# Patient Record
Sex: MALE | Race: WHITE | NOT HISPANIC OR LATINO | Employment: OTHER | URBAN - METROPOLITAN AREA
[De-identification: names, ages, dates, MRNs, and addresses within clinical notes are randomized per-mention and may not be internally consistent; named-entity substitution may affect disease eponyms.]

---

## 2024-02-26 ENCOUNTER — EVALUATION (OUTPATIENT)
Dept: PHYSICAL THERAPY | Facility: CLINIC | Age: 74
End: 2024-02-26
Payer: MEDICARE

## 2024-02-26 VITALS — SYSTOLIC BLOOD PRESSURE: 142 MMHG | OXYGEN SATURATION: 95 % | DIASTOLIC BLOOD PRESSURE: 85 MMHG | HEART RATE: 54 BPM

## 2024-02-26 DIAGNOSIS — G20.A1 PARKINSON DISEASE (MULTI): ICD-10-CM

## 2024-02-26 PROCEDURE — 97530 THERAPEUTIC ACTIVITIES: CPT | Mod: GP

## 2024-02-26 PROCEDURE — 97162 PT EVAL MOD COMPLEX 30 MIN: CPT | Mod: GP

## 2024-02-26 ASSESSMENT — ENCOUNTER SYMPTOMS
LOSS OF SENSATION IN FEET: 0
OCCASIONAL FEELINGS OF UNSTEADINESS: 1
DEPRESSION: 0

## 2024-02-26 ASSESSMENT — PAIN SCALES - GENERAL: PAINLEVEL_OUTOF10: 0 - NO PAIN

## 2024-02-26 ASSESSMENT — PAIN - FUNCTIONAL ASSESSMENT: PAIN_FUNCTIONAL_ASSESSMENT: 0-10

## 2024-02-26 ASSESSMENT — ACTIVITIES OF DAILY LIVING (ADL): ADL_ASSISTANCE: INDEPENDENT

## 2024-02-26 NOTE — PROGRESS NOTES
Physical Therapy    Physical Therapy Evaluation and Treatment      Patient Name: Danial Salazar  MRN: [id number]  Today's Date: 2/26/2024  Time Calculation  Start Time: 0215  Stop Time: 0319  Time Calculation (min): 64 min  Billing:  Evaluation:   Evaluation: (Medium): 41  Treatment:   Therapeutic Activity:  23     Insurance:  Visit #: 1  Insurance Reviewed (per information provided by  pre-cert team)  Authorization required:  No, Medicare   Dates: 2/26/24 to 5/26/24    Assessment:  PT Assessment  PT Assessment Results: Decreased strength, Impaired balance, Decreased endurance, Decreased mobility, Impaired hearing  Rehab Prognosis: Fair  Evaluation/Treatment Tolerance: Patient tolerated treatment well   73yr M pt presents to physical therapy with diagnosis of Parkinson's Disease. During examination patient has decreased BL LE strength, decreased tissue extensibility in LE, decreased functional mobility, decreased tolerance with activity, decreased dynamic balance, gait/stair dysfunction, and is (+) for tremors in UE. Spent the session evaluating and educating the patient on parkinson's disease, the benefits of PT, and how PT can assist in reducing symptoms. pt would benefit from skilled physical therapy to maximize pt safety, increase tolerance to activity and to address impairments to restore PLOF with ADLs, functional mobility and participation in activities.      Plan:  OP PT Plan  Treatment/Interventions: Cryotherapy, Education/ Instruction, Manual therapy, Neuromuscular re-education, Self care/ home management, Taping techniques, Therapeutic activities, Therapeutic exercises, Biofeedback, Gait training  PT Plan: Skilled PT  PT Frequency: 1 time per week  Duration: 6 weeks  Onset Date: 02/19/24  Certification Period Start Date: 02/26/24  Certification Period End Date: 05/26/24  Rehab Potential: Fair  Plan of Care Agreement: Patient    NV: Start PWR Moves standing; Gait with walking sticks, MIHIR on Nustep      Current Problem:   1. Parkinson disease  Referral to Physical Therapy    Follow Up In Physical Therapy          Subjective    General:  General  Reason for Referral: Parkinson's  Referred By: MD Esa  Past Medical History Relevant to Rehab: occassionally high blood pressure (according to pt), PD  Preferred Learning Style: visual, verbal, written  General Comment: Reports he was diagnosed with Parkinson's late last year - reports he was at [facility] during that time period where hazerdous toxins and chemicals were in the water. Would like to work on his gait and stability. States he is not taking any medication for parkinsons - but also states he is taking medicine for his tremors (2x/day) states it is not helping. Started using his cane about a year ago for out in the community. Reports down the stairs is harder to complete then ascending up the stairs. Says tremors are staying about the same and walking is gettign worse. walking short distances is difficult.  Precautions:  Precautions  STEADI Fall Risk Score (The score of 4 or more indicates an increased risk of falling): Yes; Fall Risk  Hearing/Visual Limitations: [place]  Medical Precautions: Fall precautions  Vital Signs:  Vital Signs  Heart Rate: 54  Heart Rate Source: Monitor  SpO2: 95 %  BP: 142/85  BP Location: Right arm  BP Method: Automatic  Pain:  Pain Assessment  Pain Assessment: 0-10  Pain Score: 0 - No pain  Home Living:  Home Living  Home Living Comment: 1 Story with 12 steps to the basement (wall with railing). Tub/Shower is being used although has a walk-in shower located in the basement. No grab bars  Prior Level of Function:  Prior Function Per Pt/Caregiver Report  Level of [place]: Independent with ADLs and functional transfers  Receives Help From: Family (wife)  ADL Assistance: Independent  Homemaking Assistance: Needs assistance  Meal Prep:  (Wife completes)  Laundry:  (Wife completes)  Vacuuming:  (wife completes)  Cleaning:  (wife  completes)  Driving/Transportation: Independent  Shopping: Independent  Ambulatory Assistance: Needs assistance  Transfers: Assistive device  Gait: Assistive device  Stairs: Railings  Vocational: Retired  Leisure: Has model trains set up in living room  Hand Dominance: Right    Objective   Functional Assessments:  Balance Comment: See MiniBest  Stairs Comment: Ascends/Descends 4 6in steps in a reciprocal pattern with BL use of HR; decreased foot clearance to clear steps. CGA with use of gait belt  Bed Mobility Comment: Sup/Sit Mod A; Sit to sup: Min A  - LE assist  Transfers Comment: IND sit/stand and stand/sit    Extremity/Trunk Assessments:  RLE   RLE : Exceptions to WFL  AROM RLE (degrees)  RLE AROM Comment: WNL  PROM RLE (degrees)  RLE PROM Comment: WNL  Strength RLE  R Hip Flexion: 4+/5  R Hip ABduction: 4/5  R Hip ADduction: 4/5  R Knee Flexion: 5/5  R Knee Extension: 4+/5  R Ankle Dorsiflexion: 4/5  R Ankle Plantar Flexion: 4/5  LLE   LLE : Exceptions to WFL  AROM LLE (degrees)  LLE AROM Comment: WNL  PROM LLE (degrees)  LLE PROM Comment: WNL  Strength LLE  L Hip Flexion: 4/5  L Hip ABduction: 4/5  L Hip ADduction: 4/5  L Knee Flexion: 4/5  L Knee Extension: 5/5  L Ankle Dorsiflexion: 5/5  L Ankle Plantar Flexion: 5/5    Gait: ambulates for 75+ft with SPC in clinic with shuffling gait, decreased knee extension throughout gait cycle; cane in the R extremity, BL knee flexion through entire gait cycle; decreased push off and swing phase; festination     HS 90/90 (+) Tight     4 Stage: NBOS: 10s, R Semi Tandem: 10s, L Tandem: 8s, R Tandem: 6s, SLS L: 20s;  SLS R: <20s    Outcome Measures:  Mini-BEST Balance Evaluation Systems Test  Anticipatory sit to stand: 2  Anticipatory rise to toes: 1  Anticipatory standing: Right  Anticipatory standing lowest score: 1  RPC Compensatory stepping correction-forward: 1  RPC Compensatory stepping correction-backward: 0  Compensatory stepping correction lateral:  Left  Compensatory stepping correction lateral lowest score: 0  Sensory orientation stance (feet together); eyes open, firm surface : 2  Sensory orientation stance (feet together); eyes closed, foam surface: 1 (18s)  DG Change in gait speed: 1  DG Walk with head turns-horizontal: 1  DG Walk with pivot turns: 1  DG Step over obstacles: 1  Time up & Go with dual task: 0  TUG seconds: 13.69  TUG Dual Task seconds: 28.5  Score: 13/28     Timed Up and Go Test  How many seconds did it take to complete the 5 tasks?: 13.69 seconds  Observe the patient’s postural stability, gait, stride length, and sway. Select All that Apply:: Slow tentative pace, Short strides, Little or no arm swing, Shuffling  TUG Cognitive: 28.5s with SPC; gait belt; CGA    Other Measures  5x Sit to Stand: 19.89s with no UE assist; standard chair  Activities - Specific Balance Confidence Scale: 45.62%     Treatments:  Therapeutic Activity  Therapeutic Activity Performed: Yes  Therapeutic Activity 1: Mod complex Assessment  1. Repeated sit to/from stands from standardized chair height. Required VC for no use of hands, nose over toes, full upright stand from chair, use of anterior shift with fwd momentum and eccentric control into chair.  2. Functional Performance via gait analysis of TUG and Cog Tug: Verbal cues for sequencing/positioning. 2x10' at CGA with gait belt.   3. Functional mobility via AROM/PROM of LE; Verbal cues for sequencing/positioning.  4. Functional Performance via MMT of LE: Hip, knee, ankle; Verbal cues for sequencing/positioning.  5. Educated pt on POC, goals of physical therapy, purpose of physical therapy, as well as the benefits in participating in physical therapy to increase functional mobility and maximize pt safety.    6. Functional Performance via MiniBest - completed at CGA to Min A, gait belt; VC for proper sequencing/positioning. Decreased dynamic balance and reactive postural control.   7. Educated patient on the disease  process of parkinson's, symptoms of parkinson's and how PT can assist with the disease.     EDUCATION:  Outpatient Education  Individual(s) Educated: Patient  Education Provided: Anatomy, Body Mechanics, Fall Risk, Home Exercise Program, Home Safety, Physiology, POC, Posture  Risk and Benefits Discussed with Patient/Caregiver/Other: yes  Patient/Caregiver Demonstrated Understanding: yes  Plan of Care Discussed and Agreed Upon: yes  Patient Response to Education: Patient/Caregiver Performed Return Demonstration of Exercises/Activities, Patient/Caregiver Asked Appropriate Questions, Patient/Caregiver Verbalized Understanding of Information    Goals:  STG: pt will be independent in HEP & symptom management    LTGs  Strength: Pt will improve B LE Strength to >/= 4+/5 to increase functional performance, tolerance to activity, and enhancing pt safety to particpate in ADLs and IADLs.    Gait: pt will demonstrate normal mechanics without pain during gait and performance of stairs, allowing for pt to return to navigating the community.     ABC: Patient will improve initial score > 70%; (Parkinson's Disease: Cut-off score of < 69% is predictive of recurrent falls; Stroke (chronic, > 6 months post); Cut-off score of 81.1% indicates relative certainty that the patient does not have a  history of falls.) Baseline: 45%    MiniBest: patient will show SEBASTIÁN of 5.5 points or >16 to decrease fall risk, improve dynamic gait, and balance. Baseline: 13/28    TUG: pt will complete TUG within 12 seconds or less (indicative of higher fall risk) in order to INC balance and enhance safety during ADLs/ IADLs. (> or equal to 12 seconds indicates high risk for falls in older adults. 11-20 seconds is normal for the frail and elderly.) OR MCID 3.4s Baseline 13.69 sec    TUG Cog: pt will complete the TUG Cog with a difference of less than 4.5s (Difference between TUG manual and Tug is > 4.5 sec, this indicates an increased risk of falls Healthy  adults):  Baseline: 28.5s    5xSTS: pt will perform 5x sit to  < 15 seconds to decrease fall risk. OR MCID 2.3s Baseline 19.89  sec    Stairs: pt will ascend/descend step over step (6in step) with proper gait mechanics and use of <1HR to promote functional mobility and improve functional performance.

## 2024-03-06 ENCOUNTER — DOCUMENTATION (OUTPATIENT)
Dept: PHYSICAL THERAPY | Facility: CLINIC | Age: 74
End: 2024-03-06
Payer: COMMERCIAL

## 2024-03-06 NOTE — PROGRESS NOTES
Physical Therapy  Therapy Communication Note    Patient Name: Danial Salazar  MRN: [id number]  Today's Date: 3/6/2024     Discipline: Physical Therapy    Missed Time: No Show    Comment: Called patient and was unable to leave a voicemail.

## 2024-03-19 ENCOUNTER — TREATMENT (OUTPATIENT)
Dept: PHYSICAL THERAPY | Facility: CLINIC | Age: 74
End: 2024-03-19
Payer: MEDICARE

## 2024-03-19 DIAGNOSIS — G20.A1 PARKINSON DISEASE (MULTI): Primary | ICD-10-CM

## 2024-03-19 PROCEDURE — 97110 THERAPEUTIC EXERCISES: CPT | Mod: GP

## 2024-03-19 PROCEDURE — 97535 SELF CARE MNGMENT TRAINING: CPT | Mod: GP

## 2024-03-19 PROCEDURE — 97112 NEUROMUSCULAR REEDUCATION: CPT | Mod: GP

## 2024-03-19 ASSESSMENT — PAIN SCALES - GENERAL
PAINLEVEL_OUTOF10: 8
PAINLEVEL_OUTOF10: 5 - MODERATE PAIN

## 2024-03-19 ASSESSMENT — PAIN DESCRIPTION - DESCRIPTORS
DESCRIPTORS: TIGHTNESS
DESCRIPTORS: OTHER (COMMENT)

## 2024-03-19 ASSESSMENT — PAIN - FUNCTIONAL ASSESSMENT: PAIN_FUNCTIONAL_ASSESSMENT: 0-10

## 2024-03-19 NOTE — LETTER
March 19, 2024    Kevin See MD  [address]  [facility] [address]    Patient: Danial Salazar   YOB: 1950   Date of Visit: 3/19/2024       Dear Kevin See MD  [address]  [place] [address]    The attached plan of care is being sent to you because your patient’s medical reimbursement requires that you certify the plan of care. Your signature is required to allow uninterrupted insurance coverage.      You may indicate your approval by signing below and faxing this form back to us at Dept Fax: [phone].    Please call Dept: [phone] with any questions or concerns.    Thank you for this referral,        Danuta Meneses, PT  [facility] [address] [address] [facility]  [address] [facility] [address]    Payer: Payor: MEDICARE / Plan: MEDICARE PART A AND B / Product Type: *No Product type* /                                                                         Date:     Dear Danuta Meneses, PT,     Re: Mr. Danial Salazar, MRN:[id number]    I certify that I have reviewed the attached plan of care and it is medically necessary for Mr. Danial Salazar (1950) who is under my care.          ______________________________________                    _________________  Provider name and credentials                                           Date and time                                                                                           Plan of Care 2/26/24   Effective from: 2/26/2024  Effective to: 5/26/2024    Plan ID: [id number]                Participants as of Finalize on 2/26/2024      Name Type Comments Contact Info    Kevin See MD Referring Provider  [phone]    Danuta Meneses PT Physical Therapist  [phone]           Last Plan Note       Author: Danuta Meneses PT Status: Sign when Signing Visit Last edited: 2/26/2024  2:15 PM         Physical Therapy    Physical Therapy Evaluation and Treatment      Patient Name:  Danial Salazar  MRN: [id number]  Today's Date: 2/26/2024  Time Calculation  Start Time: 0215  Stop Time: 0319  Time Calculation (min): 64 min  Billing:  Evaluation:   Evaluation: (Medium): 41  Treatment:   Therapeutic Activity:  23     Insurance:  Visit #: 1  Insurance Reviewed (per information provided by  pre-cert team)  Authorization required:  No, Medicare   Dates: 2/26/24 to 5/26/24    Assessment:  PT Assessment  PT Assessment Results: Decreased strength, Impaired balance, Decreased endurance, Decreased mobility, Impaired hearing  Rehab Prognosis: Fair  Evaluation/Treatment Tolerance: Patient tolerated treatment well   73yr M pt presents to physical therapy with diagnosis of Parkinson's Disease. During examination patient has decreased BL LE strength, decreased tissue extensibility in LE, decreased functional mobility, decreased tolerance with activity, decreased dynamic balance, gait/stair dysfunction, and is (+) for tremors in UE. Spent the session evaluating and educating the patient on parkinson's disease, the benefits of PT, and how PT can assist in reducing symptoms. pt would benefit from skilled physical therapy to maximize pt safety, increase tolerance to activity and to address impairments to restore PLOF with ADLs, functional mobility and participation in activities.      Plan:  OP PT Plan  Treatment/Interventions: Cryotherapy, Education/ Instruction, Manual therapy, Neuromuscular re-education, Self care/ home management, Taping techniques, Therapeutic activities, Therapeutic exercises, Biofeedback, Gait training  PT Plan: Skilled PT  PT Frequency: 1 time per week  Duration: 6 weeks  Onset Date: 02/19/24  Certification Period Start Date: 02/26/24  Certification Period End Date: 05/26/24  Rehab Potential: Fair  Plan of Care Agreement: Patient    NV: Start PWR Moves standing; Gait with walking sticks, MIHIR on Nustep     Current Problem:   1. Parkinson disease  Referral to Physical Therapy    Follow Up  In Physical Therapy          Subjective    General:  General  Reason for Referral: Parkinson's  Referred By: MD Esa  Past Medical History Relevant to Rehab: occassionally high blood pressure (according to pt), PD  Preferred Learning Style: visual, verbal, written  General Comment: Reports he was diagnosed with Parkinson's late last year - reports he was at [facility] during that time period where hazerdous toxins and chemicals were in the water. Would like to work on his gait and stability. States he is not taking any medication for parkinsons - but also states he is taking medicine for his tremors (2x/day) states it is not helping. Started using his cane about a year ago for out in the community. Reports down the stairs is harder to complete then ascending up the stairs. Says tremors are staying about the same and walking is gettign worse. walking short distances is difficult.  Precautions:  Precautions  STEADI Fall Risk Score (The score of 4 or more indicates an increased risk of falling): Yes; Fall Risk  Hearing/Visual Limitations: [facility]  Medical Precautions: Fall precautions  Vital Signs:  Vital Signs  Heart Rate: 54  Heart Rate Source: Monitor  SpO2: 95 %  BP: 142/85  BP Location: Right arm  BP Method: Automatic  Pain:  Pain Assessment  Pain Assessment: 0-10  Pain Score: 0 - No pain  Home Living:  Home Living  Home Living Comment: 1 Story with 12 steps to the basement (wall with railing). Tub/Shower is being used although has a walk-in shower located in the basement. No grab bars  Prior Level of Function:  Prior Function Per Pt/Caregiver Report  Level of [place]: Independent with ADLs and functional transfers  Receives Help From: Family (wife)  ADL Assistance: Independent  Homemaking Assistance: Needs assistance  Meal Prep:  (Wife completes)  Laundry:  (Wife completes)  Vacuuming:  (wife completes)  Cleaning:  (wife completes)  Driving/Transportation: Independent  Shopping: Independent  Ambulatory  Assistance: Needs assistance  Transfers: Assistive device  Gait: Assistive device  Stairs: Railings  Vocational: Retired  Leisure: Has model trains set up in living room  Hand Dominance: Right    Objective   Functional Assessments:  Balance Comment: See MiniBest  Stairs Comment: Ascends/Descends 4 6in steps in a reciprocal pattern with BL use of HR; decreased foot clearance to clear steps. CGA with use of gait belt  Bed Mobility Comment: Sup/Sit Mod A; Sit to sup: Min A  - LE assist  Transfers Comment: IND sit/stand and stand/sit    Extremity/Trunk Assessments:  RLE   RLE : Exceptions to WFL  AROM RLE (degrees)  RLE AROM Comment: WNL  PROM RLE (degrees)  RLE PROM Comment: WNL  Strength RLE  R Hip Flexion: 4+/5  R Hip ABduction: 4/5  R Hip ADduction: 4/5  R Knee Flexion: 5/5  R Knee Extension: 4+/5  R Ankle Dorsiflexion: 4/5  R Ankle Plantar Flexion: 4/5  LLE   LLE : Exceptions to WFL  AROM LLE (degrees)  LLE AROM Comment: WNL  PROM LLE (degrees)  LLE PROM Comment: WNL  Strength LLE  L Hip Flexion: 4/5  L Hip ABduction: 4/5  L Hip ADduction: 4/5  L Knee Flexion: 4/5  L Knee Extension: 5/5  L Ankle Dorsiflexion: 5/5  L Ankle Plantar Flexion: 5/5    Gait: ambulates for 75+ft with SPC in clinic with shuffling gait, decreased knee extension throughout gait cycle; cane in the R extremity, BL knee flexion through entire gait cycle; decreased push off and swing phase; festination     HS 90/90 (+) Tight     4 Stage: NBOS: 10s, R Semi Tandem: 10s, L Tandem: 8s, R Tandem: 6s, SLS L: 20s;  SLS R: <20s    Outcome Measures:  Mini-BEST Balance Evaluation Systems Test  Anticipatory sit to stand: 2  Anticipatory rise to toes: 1  Anticipatory standing: Right  Anticipatory standing lowest score: 1  RPC Compensatory stepping correction-forward: 1  RPC Compensatory stepping correction-backward: 0  Compensatory stepping correction lateral: Left  Compensatory stepping correction lateral lowest score: 0  Sensory orientation stance (feet  together); eyes open, firm surface : 2  Sensory orientation stance (feet together); eyes closed, foam surface: 1 (18s)  DG Change in gait speed: 1  DG Walk with head turns-horizontal: 1  DG Walk with pivot turns: 1  DG Step over obstacles: 1  Time up & Go with dual task: 0  TUG seconds: 13.69  TUG Dual Task seconds: 28.5  Score: 13/28     Timed Up and Go Test  How many seconds did it take to complete the 5 tasks?: 13.69 seconds  Observe the patient’s postural stability, gait, stride length, and sway. Select All that Apply:: Slow tentative pace, Short strides, Little or no arm swing, Shuffling  TUG Cognitive: 28.5s with SPC; gait belt; CGA    Other Measures  5x Sit to Stand: 19.89s with no UE assist; standard chair  Activities - Specific Balance Confidence Scale: 45.62%     Treatments:  Therapeutic Activity  Therapeutic Activity Performed: Yes  Therapeutic Activity 1: Mod complex Assessment  1. Repeated sit to/from stands from standardized chair height. Required VC for no use of hands, nose over toes, full upright stand from chair, use of anterior shift with fwd momentum and eccentric control into chair.  2. Functional Performance via gait analysis of TUG and Cog Tug: Verbal cues for sequencing/positioning. 2x10' at CGA with gait belt.   3. Functional mobility via AROM/PROM of LE; Verbal cues for sequencing/positioning.  4. Functional Performance via MMT of LE: Hip, knee, ankle; Verbal cues for sequencing/positioning.  5. Educated pt on POC, goals of physical therapy, purpose of physical therapy, as well as the benefits in participating in physical therapy to increase functional mobility and maximize pt safety.    6. Functional Performance via MiniBest - completed at CGA to Min A, gait belt; VC for proper sequencing/positioning. Decreased dynamic balance and reactive postural control.   7. Educated patient on the disease process of parkinson's, symptoms of parkinson's and how PT can assist with the disease.      EDUCATION:  Outpatient Education  Individual(s) Educated: Patient  Education Provided: Anatomy, Body Mechanics, Fall Risk, Home Exercise Program, Home Safety, Physiology, POC, Posture  Risk and Benefits Discussed with Patient/Caregiver/Other: yes  Patient/Caregiver Demonstrated Understanding: yes  Plan of Care Discussed and Agreed Upon: yes  Patient Response to Education: Patient/Caregiver Performed Return Demonstration of Exercises/Activities, Patient/Caregiver Asked Appropriate Questions, Patient/Caregiver Verbalized Understanding of Information    Goals:  STG: pt will be independent in HEP & symptom management    LTGs  Strength: Pt will improve B LE Strength to >/= 4+/5 to increase functional performance, tolerance to activity, and enhancing pt safety to particpate in ADLs and IADLs.    Gait: pt will demonstrate normal mechanics without pain during gait and performance of stairs, allowing for pt to return to navigating the community.     ABC: Patient will improve initial score > 70%; (Parkinson's Disease: Cut-off score of < 69% is predictive of recurrent falls; Stroke (chronic, > 6 months post); Cut-off score of 81.1% indicates relative certainty that the patient does not have a  history of falls.) Baseline: 45%    MiniBest: patient will show SEBASTIÁN of 5.5 points or >16 to decrease fall risk, improve dynamic gait, and balance. Baseline: 13/28    TUG: pt will complete TUG within 12 seconds or less (indicative of higher fall risk) in order to INC balance and enhance safety during ADLs/ IADLs. (> or equal to 12 seconds indicates high risk for falls in older adults. 11-20 seconds is normal for the frail and elderly.) OR MCID 3.4s Baseline 13.69 sec    TUG Cog: pt will complete the TUG Cog with a difference of less than 4.5s (Difference between TUG manual and Tug is > 4.5 sec, this indicates an increased risk of falls Healthy adults):  Baseline: 28.5s    5xSTS: pt will perform 5x sit to  < 15 seconds to  decrease fall risk. OR MCID 2.3s Baseline 19.89  sec    Stairs: pt will ascend/descend step over step (6in step) with proper gait mechanics and use of <1HR to promote functional mobility and improve functional performance.              Current Participants as of 3/19/2024      Name Type Comments Contact Info    Kevin See MD Referring Provider  [phone]    Signature pending    Danuta Meneses PT Physical Therapist  [phone]    Electronically signed by Danuta Meneses PT at 2/26/2024 1610 EST

## 2024-03-19 NOTE — PROGRESS NOTES
Physical Therapy    Physical Therapy Treatment    Patient Name: Danial Salazar  MRN: [id number]  Today's Date: 3/19/2024  Time Calculation  Start Time: 0115  Stop Time: 0200  Time Calculation (min): 45 min  Billing:  Treatment:   Therapeutic Exercise:  15  NMR:  20  Self care/management:  10    Insurance:  Visit #: 2  Insurance Reviewed (per information provided by  pre-cert team)  Authorization required:  No, Medicare (Initial send 2/26/24; 2nd send 3/19/24)   Dates: 2/26/24 to 5/26/24    Assessment:  PT Assessment  PT Assessment Results: Decreased mobility, Impaired balance, Pain  Rehab Prognosis: Fair  Evaluation/Treatment Tolerance: Patient tolerated treatment well  pt tolerated treatment for first PT treatment well. Was able to initiate seated PWR moves. Pt required increased VC and visual cueing for proper technique while performing the PWR moves. Spent time educating the patient on Parkinson's disease, benefits of exercise, DBS, and the plan for the following session. pt needs continued skilled PT to work on gait and dynamic movement as well as tissue extensibility to allow return to optimal level of functional mobility and tolerance with activities. pt left session with all questions answered.    Plan:  OP PT Plan  Treatment/Interventions: Cryotherapy, Education/ Instruction, Manual therapy, Neuromuscular re-education, Self care/ home management, Taping techniques, Therapeutic activities, Therapeutic exercises, Biofeedback, Gait training  PT Plan: Skilled PT  NV: Walking with canes as poles; Try to get a higher intensity on nustep   Upcoming MD appt: May 7th     Current Problem  1. Parkinson disease          General  PT  Visit  PT Received On: 03/19/24  Response to Previous Treatment: Patient with no complaints from previous session., Not applicable  General  Reason for Referral: Parkinson's  Referred By: MD Esa  Past Medical History Relevant to Rehab: occassionally high blood pressure (according to pt),  PD  Preferred Learning Style: visual, verbal, written  General Comment: No falls reported since last visit. reports he thinks walking is getting worse and his R leg gets sore when he uses the elliptical.    Subjective    Precautions  Precautions  STEADI Fall Risk Score (The score of 4 or more indicates an increased risk of falling): yes  Hearing/Visual Limitations: [place]  Medical Precautions: Fall precautions  Pain  Pain Assessment  Pain Assessment: 0-10  Pain Score: 5 - Moderate pain  Pain Type: Acute pain  Pain Location: Leg  Pain Orientation: Left  Pain Descriptors: Other (Comment) (Pain from ankles to knee)  Multiple Pain Sites: Two  Pain 2  Pain Score 2: 8  Pain Type 2: Acute pain  Pain Location 2: Leg  Pain Orientation 2: Right  Pain Descriptors 2: Tightness    Objective    Posture  Postural Control  Posture Comment: Fwd head, rounded shoulders,    Treatments:  Therapeutic Exercise  Therapeutic Exercise Performed: Yes  Therapeutic Exercise Activity 1: Nustep for 8min with goal to steps per minute above 70; patient had increased difficulty keeping it at  that level  Therapeutic Exercise Activity 2: HS Stretch BL 2x30s  Therapeutic Exercise Activity 3: Slant Board x30s    Balance/Neuromuscular Re-Education  Balance/Neuromuscular Re-Education Activity Performed: Yes  Balance/Neuromuscular Re-Education Activity 1: PWR Up Seated x10  Balance/Neuromuscular Re-Education Activity 2: PWR Rock x10  Balance/Neuromuscular Re-Education Activity 3: PWR Twist x10  Balance/Neuromuscular Re-Education Activity 4: PWR Step x10    Self-Care/Home Management:   - Educated and supplied print outs for PD, exercise benefits of PD, and DBS handout     OP EDUCATION:  Outpatient Education  Individual(s) Educated: Patient  Education Provided: Anatomy, Body Mechanics, Home Safety, Home Exercise Program, Physiology, POC, Posture  Patient/Caregiver Demonstrated Understanding: yes  Patient Response to Education: Patient/Caregiver Performed  Return Demonstration of Exercises/Activities, Patient/Caregiver Asked Appropriate Questions, Patient/Caregiver Verbalized Understanding of Information

## 2024-04-02 ENCOUNTER — TREATMENT (OUTPATIENT)
Dept: PHYSICAL THERAPY | Facility: CLINIC | Age: 74
End: 2024-04-02
Payer: MEDICARE

## 2024-04-02 DIAGNOSIS — G20.A1 PARKINSON DISEASE (MULTI): Primary | ICD-10-CM

## 2024-04-02 PROCEDURE — 97116 GAIT TRAINING THERAPY: CPT | Mod: GP

## 2024-04-02 PROCEDURE — 97110 THERAPEUTIC EXERCISES: CPT | Mod: GP

## 2024-04-02 PROCEDURE — 97112 NEUROMUSCULAR REEDUCATION: CPT | Mod: GP

## 2024-04-02 ASSESSMENT — PAIN SCALES - GENERAL: PAINLEVEL_OUTOF10: 0 - NO PAIN

## 2024-04-02 ASSESSMENT — PAIN - FUNCTIONAL ASSESSMENT: PAIN_FUNCTIONAL_ASSESSMENT: 0-10

## 2024-04-02 NOTE — PROGRESS NOTES
Physical Therapy Treatment    Patient Name: Danial Salazar  MRN: [id number]  Today's Date: 4/2/2024  Time Calculation  Start Time: 0200  Stop Time: 0242  Time Calculation (min): 42 min  Billing:  Treatment:   Therapeutic Exercise:  8  NMR:  19  Gait:  15    Insurance:  Visit #: 3  Insurance Reviewed (per information provided by  pre-cert team)  Authorization required:  No, Medicare (Initial send 2/26/24; 2nd send 3/19/24; 4/2/24)   Dates: 2/26/24 to 5/26/24    Assessment:  PT Assessment  PT Assessment Results: Decreased mobility, Impaired balance, Pain  Rehab Prognosis: Fair  Evaluation/Treatment Tolerance: Patient tolerated treatment well  pt tolerated treatment with some difficulty - has decreased tolerance with activity and required multiple rest breaks. During session patient has a hard time focusing on exercises and needs repeated VC and TC for proper sequencing/positioning of exercises. Discussed the importance of scheduling PT sessions consistently in order to manage his PD diagnosis. pt needs continued skilled PT to work on gait, strength, and mobility, and balance to allow return to optimal level of functional mobility and tolerance with activities. pt left session with all questions answered.     Plan:  OP PT Plan  Treatment/Interventions: Cryotherapy, Education/ Instruction, Manual therapy, Neuromuscular re-education, Self care/ home management, Taping techniques, Therapeutic activities, Therapeutic exercises, Biofeedback, Gait training  PT Plan: Skilled PT  Onset Date: 02/19/24  Rehab Potential: Fair  Plan of Care Agreement: Patient  NV: Re-Assessment     Current Problem  Problem List Items Addressed This Visit         Codes    Parkinson disease (CMS/[facility])    -  Primary G20.A1            General  PT  Visit  PT Received On: 04/02/24  Response to Previous Treatment: Patient with no complaints from previous session., Not applicable  General  Reason for Referral: Parkinson's  Referred By: MD Esa  Past  "Medical History Relevant to Rehab: occassionally high blood pressure (according to pt), PD  Preferred Learning Style: visual, verbal, written  General Comment: Reports falls - says he lost his balance and fell into a crib and reports it happened about a week ago - just a mattress of a crib so he reports falling on something soft. Was able to get up and reports no brusies and did not lose conciousness or hit his head. Reports after he does the elliptical (9min) nis legs get too stiff and then he states he crawls up the steps    Subjective    Precautions  Precautions  STEADI Fall Risk Score (The score of 4 or more indicates an increased risk of falling): yes  Hearing/Visual Limitations: [place]  Medical Precautions: Fall precautions  Pain  Pain Assessment  Pain Assessment: 0-10  Pain Score: 0 - No pain    Objective   Posture  Postural Control  Posture Comment: Fwd head, rounded shoulders,    Gait: The patient is ambulating with the following device: cane. Gait deviations include: Lacks heel strike, Decreased velocity, Shuffling, Downward gaze, Forward flexed, and Parkinsons Gait pattern - BL knee flexion.     Treatments:  Therapeutic Exercise  Therapeutic Exercise Performed: Yes  Therapeutic Exercise Activity 1: Nustep for 8min with goal to steps per minute above 70 -90; patient had increased difficulty keeping it at  that level when focus was not on looking at the \"step per minute\" number.    Balance/Neuromuscular Re-Education  Balance/Neuromuscular Re-Education Activity 1: PWR Up Standing x10  Balance/Neuromuscular Re-Education Activity 2: PWR Rock x10  Balance/Neuromuscular Re-Education Activity 3: PWR Twist x10  Balance/Neuromuscular Re-Education Activity 4: PWR Step x5    Gait Trainin canes to mimic walking pole - and normalize gait pattern   70bpm-75bpm metronome CGA with gait belt 4x75'; 2 Canes to mimic walking poles     OP EDUCATION:  Outpatient Education  Individual(s) Educated: Patient  Education " Provided: Anatomy, Body Mechanics, Home Safety, Home Exercise Program, Physiology, POC, Posture  Patient/Caregiver Demonstrated Understanding: yes  Patient Response to Education: Patient/Caregiver Performed Return Demonstration of Exercises/Activities, Patient/Caregiver Asked Appropriate Questions, Patient/Caregiver Verbalized Understanding of Information

## 2024-04-02 NOTE — LETTER
April 2, 2024    Kevin See MD  [address]  [facility] [address]    Patient: Danial Salazar   YOB: 1950   Date of Visit: 4/2/2024       Dear Kevin See MD  [address]  [place] [address]    The attached plan of care is being sent to you because your patient’s medical reimbursement requires that you certify the plan of care. Your signature is required to allow uninterrupted insurance coverage.      You may indicate your approval by signing below and faxing this form back to us at Dept Fax: [phone].    Please call Dept: [phone] with any questions or concerns.    Thank you for this referral,        Danuta Meneses, PT  [facility] [address] [address]  [address] [address] [address]    Payer: Payor: MEDICARE / Plan: MEDICARE PART A AND B / Product Type: *No Product type* /                                                                         Date:     Dear Danuta Meneses, PT,     Re: Mr. Danial Salazar, MRN:[id number]    I certify that I have reviewed the attached plan of care and it is medically necessary for Mr. Danial Salazar (1950) who is under my care.          ______________________________________                    _________________  Provider name and credentials                                           Date and time                                                                                           Plan of Care 2/26/24   Effective from: 2/26/2024  Effective to: 5/26/2024    Plan ID: [id number]                Participants as of Finalize on 2/26/2024      Name Type Comments Contact Info    Kevin See MD Referring Provider  [phone]    Danuta Meneses PT Physical Therapist  [phone]           Last Plan Note       Author: Danuta Meneses PT Status: Sign when Signing Visit Last edited: 2/26/2024  2:15 PM         Physical Therapy    Physical Therapy Evaluation and Treatment      Patient Name:  Danial Salazar  MRN: [id number]  Today's Date: 2/26/2024  Time Calculation  Start Time: 0215  Stop Time: 0319  Time Calculation (min): 64 min  Billing:  Evaluation:   Evaluation: (Medium): 41  Treatment:   Therapeutic Activity:  23     Insurance:  Visit #: 1  Insurance Reviewed (per information provided by  pre-cert team)  Authorization required:  No, Medicare   Dates: 2/26/24 to 5/26/24    Assessment:  PT Assessment  PT Assessment Results: Decreased strength, Impaired balance, Decreased endurance, Decreased mobility, Impaired hearing  Rehab Prognosis: Fair  Evaluation/Treatment Tolerance: Patient tolerated treatment well   73yr M pt presents to physical therapy with diagnosis of Parkinson's Disease. During examination patient has decreased BL LE strength, decreased tissue extensibility in LE, decreased functional mobility, decreased tolerance with activity, decreased dynamic balance, gait/stair dysfunction, and is (+) for tremors in UE. Spent the session evaluating and educating the patient on parkinson's disease, the benefits of PT, and how PT can assist in reducing symptoms. pt would benefit from skilled physical therapy to maximize pt safety, increase tolerance to activity and to address impairments to restore PLOF with ADLs, functional mobility and participation in activities.      Plan:  OP PT Plan  Treatment/Interventions: Cryotherapy, Education/ Instruction, Manual therapy, Neuromuscular re-education, Self care/ home management, Taping techniques, Therapeutic activities, Therapeutic exercises, Biofeedback, Gait training  PT Plan: Skilled PT  PT Frequency: 1 time per week  Duration: 6 weeks  Onset Date: 02/19/24  Certification Period Start Date: 02/26/24  Certification Period End Date: 05/26/24  Rehab Potential: Fair  Plan of Care Agreement: Patient    NV: Start PWR Moves standing; Gait with walking sticks, MIHIR on Nustep     Current Problem:   1. Parkinson disease  Referral to Physical Therapy    Follow Up  In Physical Therapy          Subjective    General:  General  Reason for Referral: Parkinson's  Referred By: MD Esa  Past Medical History Relevant to Rehab: occassionally high blood pressure (according to pt), PD  Preferred Learning Style: visual, verbal, written  General Comment: Reports he was diagnosed with Parkinson's late last year - reports he was at [facility] during that time period where hazerdous toxins and chemicals were in the water. Would like to work on his gait and stability. States he is not taking any medication for parkinsons - but also states he is taking medicine for his tremors (2x/day) states it is not helping. Started using his cane about a year ago for out in the community. Reports down the stairs is harder to complete then ascending up the stairs. Says tremors are staying about the same and walking is gettign worse. walking short distances is difficult.  Precautions:  Precautions  STEADI Fall Risk Score (The score of 4 or more indicates an increased risk of falling): Yes; Fall Risk  Hearing/Visual Limitations: [facility]  Medical Precautions: Fall precautions  Vital Signs:  Vital Signs  Heart Rate: 54  Heart Rate Source: Monitor  SpO2: 95 %  BP: 142/85  BP Location: Right arm  BP Method: Automatic  Pain:  Pain Assessment  Pain Assessment: 0-10  Pain Score: 0 - No pain  Home Living:  Home Living  Home Living Comment: 1 Story with 12 steps to the basement (wall with railing). Tub/Shower is being used although has a walk-in shower located in the basement. No grab bars  Prior Level of Function:  Prior Function Per Pt/Caregiver Report  Level of [place]: Independent with ADLs and functional transfers  Receives Help From: Family (wife)  ADL Assistance: Independent  Homemaking Assistance: Needs assistance  Meal Prep:  (Wife completes)  Laundry:  (Wife completes)  Vacuuming:  (wife completes)  Cleaning:  (wife completes)  Driving/Transportation: Independent  Shopping: Independent  Ambulatory  Assistance: Needs assistance  Transfers: Assistive device  Gait: Assistive device  Stairs: Railings  Vocational: Retired  Leisure: Has model trains set up in living room  Hand Dominance: Right    Objective   Functional Assessments:  Balance Comment: See MiniBest  Stairs Comment: Ascends/Descends 4 6in steps in a reciprocal pattern with BL use of HR; decreased foot clearance to clear steps. CGA with use of gait belt  Bed Mobility Comment: Sup/Sit Mod A; Sit to sup: Min A  - LE assist  Transfers Comment: IND sit/stand and stand/sit    Extremity/Trunk Assessments:  RLE   RLE : Exceptions to WFL  AROM RLE (degrees)  RLE AROM Comment: WNL  PROM RLE (degrees)  RLE PROM Comment: WNL  Strength RLE  R Hip Flexion: 4+/5  R Hip ABduction: 4/5  R Hip ADduction: 4/5  R Knee Flexion: 5/5  R Knee Extension: 4+/5  R Ankle Dorsiflexion: 4/5  R Ankle Plantar Flexion: 4/5  LLE   LLE : Exceptions to WFL  AROM LLE (degrees)  LLE AROM Comment: WNL  PROM LLE (degrees)  LLE PROM Comment: WNL  Strength LLE  L Hip Flexion: 4/5  L Hip ABduction: 4/5  L Hip ADduction: 4/5  L Knee Flexion: 4/5  L Knee Extension: 5/5  L Ankle Dorsiflexion: 5/5  L Ankle Plantar Flexion: 5/5    Gait: ambulates for 75+ft with SPC in clinic with shuffling gait, decreased knee extension throughout gait cycle; cane in the R extremity, BL knee flexion through entire gait cycle; decreased push off and swing phase; festination     HS 90/90 (+) Tight     4 Stage: NBOS: 10s, R Semi Tandem: 10s, L Tandem: 8s, R Tandem: 6s, SLS L: 20s;  SLS R: <20s    Outcome Measures:  Mini-BEST Balance Evaluation Systems Test  Anticipatory sit to stand: 2  Anticipatory rise to toes: 1  Anticipatory standing: Right  Anticipatory standing lowest score: 1  RPC Compensatory stepping correction-forward: 1  RPC Compensatory stepping correction-backward: 0  Compensatory stepping correction lateral: Left  Compensatory stepping correction lateral lowest score: 0  Sensory orientation stance (feet  together); eyes open, firm surface : 2  Sensory orientation stance (feet together); eyes closed, foam surface: 1 (18s)  DG Change in gait speed: 1  DG Walk with head turns-horizontal: 1  DG Walk with pivot turns: 1  DG Step over obstacles: 1  Time up & Go with dual task: 0  TUG seconds: 13.69  TUG Dual Task seconds: 28.5  Score: 13/28     Timed Up and Go Test  How many seconds did it take to complete the 5 tasks?: 13.69 seconds  Observe the patient’s postural stability, gait, stride length, and sway. Select All that Apply:: Slow tentative pace, Short strides, Little or no arm swing, Shuffling  TUG Cognitive: 28.5s with SPC; gait belt; CGA    Other Measures  5x Sit to Stand: 19.89s with no UE assist; standard chair  Activities - Specific Balance Confidence Scale: 45.62%     Treatments:  Therapeutic Activity  Therapeutic Activity Performed: Yes  Therapeutic Activity 1: Mod complex Assessment  1. Repeated sit to/from stands from standardized chair height. Required VC for no use of hands, nose over toes, full upright stand from chair, use of anterior shift with fwd momentum and eccentric control into chair.  2. Functional Performance via gait analysis of TUG and Cog Tug: Verbal cues for sequencing/positioning. 2x10' at CGA with gait belt.   3. Functional mobility via AROM/PROM of LE; Verbal cues for sequencing/positioning.  4. Functional Performance via MMT of LE: Hip, knee, ankle; Verbal cues for sequencing/positioning.  5. Educated pt on POC, goals of physical therapy, purpose of physical therapy, as well as the benefits in participating in physical therapy to increase functional mobility and maximize pt safety.    6. Functional Performance via MiniBest - completed at CGA to Min A, gait belt; VC for proper sequencing/positioning. Decreased dynamic balance and reactive postural control.   7. Educated patient on the disease process of parkinson's, symptoms of parkinson's and how PT can assist with the disease.      EDUCATION:  Outpatient Education  Individual(s) Educated: Patient  Education Provided: Anatomy, Body Mechanics, Fall Risk, Home Exercise Program, Home Safety, Physiology, POC, Posture  Risk and Benefits Discussed with Patient/Caregiver/Other: yes  Patient/Caregiver Demonstrated Understanding: yes  Plan of Care Discussed and Agreed Upon: yes  Patient Response to Education: Patient/Caregiver Performed Return Demonstration of Exercises/Activities, Patient/Caregiver Asked Appropriate Questions, Patient/Caregiver Verbalized Understanding of Information    Goals:  STG: pt will be independent in HEP & symptom management    LTGs  Strength: Pt will improve B LE Strength to >/= 4+/5 to increase functional performance, tolerance to activity, and enhancing pt safety to particpate in ADLs and IADLs.    Gait: pt will demonstrate normal mechanics without pain during gait and performance of stairs, allowing for pt to return to navigating the community.     ABC: Patient will improve initial score > 70%; (Parkinson's Disease: Cut-off score of < 69% is predictive of recurrent falls; Stroke (chronic, > 6 months post); Cut-off score of 81.1% indicates relative certainty that the patient does not have a  history of falls.) Baseline: 45%    MiniBest: patient will show [place] of 5.5 points or >16 to decrease fall risk, improve dynamic gait, and balance. Baseline: 13/28    TUG: pt will complete TUG within 12 seconds or less (indicative of higher fall risk) in order to INC balance and enhance safety during ADLs/ IADLs. (> or equal to 12 seconds indicates high risk for falls in older adults. 11-20 seconds is normal for the frail and elderly.) OR MCID 3.4s Baseline 13.69 sec    TUG Cog: pt will complete the TUG Cog with a difference of less than 4.5s (Difference between TUG manual and Tug is > 4.5 sec, this indicates an increased risk of falls Healthy adults):  Baseline: 28.5s    5xSTS: pt will perform 5x sit to  < 15 seconds to  decrease fall risk. OR MCID 2.3s Baseline 19.89  sec    Stairs: pt will ascend/descend step over step (6in step) with proper gait mechanics and use of <1HR to promote functional mobility and improve functional performance.              Current Participants as of 4/2/2024      Name Type Comments Contact Info    Kevin See MD Referring Provider  [phone]    Signature pending    Danuta Meneses PT Physical Therapist  [phone]    Electronically signed by Danuta Meneses PT at 2/26/2024 1610 EST

## 2024-04-11 ENCOUNTER — TREATMENT (OUTPATIENT)
Dept: PHYSICAL THERAPY | Facility: CLINIC | Age: 74
End: 2024-04-11
Payer: MEDICARE

## 2024-04-11 DIAGNOSIS — G20.A1 PARKINSON DISEASE (MULTI): Primary | ICD-10-CM

## 2024-04-11 PROCEDURE — 97112 NEUROMUSCULAR REEDUCATION: CPT | Mod: GP

## 2024-04-11 PROCEDURE — 97110 THERAPEUTIC EXERCISES: CPT | Mod: GP

## 2024-04-11 PROCEDURE — 97530 THERAPEUTIC ACTIVITIES: CPT | Mod: GP

## 2024-04-11 ASSESSMENT — PAIN - FUNCTIONAL ASSESSMENT: PAIN_FUNCTIONAL_ASSESSMENT: 0-10

## 2024-04-11 ASSESSMENT — PAIN SCALES - GENERAL: PAINLEVEL_OUTOF10: 0 - NO PAIN

## 2024-04-11 NOTE — PROGRESS NOTES
Physical Therapy Re-Assessment & Treatment     Patient Name: Dnaial Salazar  MRN: [id number]  Today's Date: 4/11/2024  Time Calculation  Start Time: 0255  Stop Time: 0355  Time Calculation (min): 60 min  Billing:  Treatment:   Therapeutic Exercise:  8  NMR:  15  Therapeutic Activity:  37    Insurance:  Visit #: 4  Insurance Reviewed (per information provided by  pre-cert team)  Authorization required:  No, Medicare (Initial send 2/26/24; 2nd send 3/19/24; 3rd send 4/2/24)   Dates: 2/26/24 to 5/26/24    Assessment:  PT Assessment  PT Assessment Results: Decreased mobility, Impaired balance, Pain  Rehab Prognosis: Fair  Evaluation/Treatment Tolerance: Patient tolerated treatment well  Pt presents to physical therapy as a re-evaluation. Pt has improved with outcome tests and strength however at this time pt continues to demonstrate deficits in LE strength, gait, balance, functional mobility, and tolerance with activity. Discussed with the patient the importance of physical activity for his disease diagnosis. pt would benefit from continued skilled physical therapy to maximize pt safety, increase tolerance to activity and to address impairments to restore PLOF with ADLs, functional mobility and participation in activities.      Plan:  OP PT Plan  Treatment/Interventions: Cryotherapy, Education/ Instruction, Manual therapy, Neuromuscular re-education, Self care/ home management, Taping techniques, Therapeutic activities, Therapeutic exercises, Biofeedback, Gait training  PT Plan: Skilled PT  PT Frequency: 1 time per week  Duration: 6 weeks  Onset Date: 02/19/24  Rehab Potential: Fair  Plan of Care Agreement: Patient  NV: PWR Moves, MIHIR, gait     Current Problem  Problem List Items Addressed This Visit         Codes    Parkinson disease (CMS/[facility])    -  Primary G20.A1          General  PT  Visit  PT Received On: 04/11/24  Response to Previous Treatment: Patient with no complaints from previous session., Partial  compliance with home exercise program  General  Reason for Referral: Parkinson's  Referred By: MD Esa  Past Medical History Relevant to Rehab: occassionally high blood pressure (according to pt), PD  Preferred Learning Style: visual, verbal, written  General Comment: Reports no falls, has not been able to complete PWR moves this week due to losing his print out of exercises    Subjective    Precautions  Precautions  STEADI Fall Risk Score (The score of 4 or more indicates an increased risk of falling): yes  Hearing/Visual Limitations: [place]  Medical Precautions: Fall precautions  Pain  Pain Assessment  Pain Assessment: 0-10  Pain Score: 0 - No pain    Objective   Functional Assessments:  Balance Comment: See MiniBest  Stairs Comment: Ascends/Descends 4 6in steps in a reciprocal pattern with BL use of HR; decreased foot clearance to clear steps. CGA with use of gait belt  Bed Mobility Comment: Sup/Sit Mod A; Sit to sup: Min A  - LE assist  Transfers Comment: IND sit/stand and stand/sit     Extremity/Trunk Assessments:  RLE   RLE : Exceptions to WFL  AROM RLE (degrees)  RLE AROM Comment: WNL  PROM RLE (degrees)  RLE PROM Comment: WNL  Strength RLE  R Hip Flexion: 4+/5 --same  R Hip ABduction: 4/5 --> same  R Hip ADduction: 4/5 --> same   R Knee Flexion: 5/5  R Knee Extension: 4+/5 --> 5/5   R Ankle Dorsiflexion: 4/5 --> 5/5  R Ankle Plantar Flexion: 4/5 --> 5/5    LLE   LLE : Exceptions to WFL  AROM LLE (degrees)  LLE AROM Comment: WNL  PROM LLE (degrees)  LLE PROM Comment: WNL  Strength LLE  L Hip Flexion: 4/5 --> 4+/5  L Hip ABduction: 4/5 --> same   L Hip ADduction: 4/5 --> same   L Knee Flexion: 4/5 --> 4+/5  L Knee Extension: 5/5  L Ankle Dorsiflexion: 5/5  L Ankle Plantar Flexion: 5/5     Gait: ambulates for 75+ft with SPC in clinic with shuffling gait, decreased knee extension throughout gait cycle; cane in the R extremity, BL knee flexion through entire gait cycle; decreased push off and swing phase;  "festination      HS 90/90 (+) Tight (NT)     4 Stage: NBOS: 10s, R Semi Tandem: 10s, L Tandem: 8s, R Tandem: 6s, SLS L: 20s;  SLS R: <20s (NT at recheck)      Outcome Measures:  Timed Up and Go Test  How many seconds did it take to complete the 5 tasks?: 10.21 seconds  Observe the patient’s postural stability, gait, stride length, and sway. Select All that Apply:: Little or no arm swing, Shuffling (SPC)  TUG Cognitive: 15.93 s with SPC; gait belt; CGA    Other Measures  5x Sit to Stand: 15.42s with no UE assist; standard chair  MiniBest: 18/28    Treatments:  Therapeutic Exercise  Therapeutic Exercise Performed: Yes  Therapeutic Exercise Activity 1: Nustep for 8min with goal to steps per minute > 70 -90    Therapeutic Activity  Therapeutic Activity Performed: Yes  Therapeutic Activity 1: Re-Assessment  1. Repeated sit to/from stands from standardized chair height. Required VC for no use of hands, nose over toes, full upright stand from chair, use of anterior shift with fwd momentum and eccentric control into chair.  2. Functional Performance via gait analysis of TUG and Cog Tug: Verbal cues for sequencing/positioning. 2x10' at CGA with gait belt.   3. Functional mobility via AROM/PROM of LE; Verbal cues for sequencing/positioning.  4. Functional Performance via MMT of LE: Hip, knee, ankle; Verbal cues for sequencing/positioning.  5. Educated pt on POC, goals of physical therapy, purpose of physical therapy, as well as the benefits in participating in physical therapy to increase functional mobility and maximize pt safety.    6. Functional Performance via MiniBest - completed at CGA to Min A, gait belt; VC for proper sequencing/positioning. Decreased dynamic balance and reactive postural control.     Balance/Neuromuscular Re-Education\" Max VC and Visual cues for patient to perform correctly  Balance/Neuromuscular Re-Education Activity 1: PWR Up Standing x10  Balance/Neuromuscular Re-Education Activity 2: PWR Rock x10 " seated  Balance/Neuromuscular Re-Education Activity 3: PWR Twist x10 seated  Balance/Neuromuscular Re-Education Activity 4: PWR Step x10 modified seated     OP EDUCATION:  Outpatient Education  Individual(s) Educated: Patient  Education Provided: Anatomy, Body Mechanics, Home Safety, Home Exercise Program, Physiology, POC, Posture  Patient/Caregiver Demonstrated Understanding: yes  Patient Response to Education: Patient/Caregiver Performed Return Demonstration of Exercises/Activities, Patient/Caregiver Asked Appropriate Questions, Patient/Caregiver Verbalized Understanding of Information  Access Code: [email or web address]  URL: [email or web address]  Date: 04/11/2024  Prepared by: Danuta Meneses    Exercises  - Standing 'L' Stretch at Counter  - 1 x daily - 7 x weekly - 3 sets - 20s hold  PWR MOVES SEATED     Goals: Re-Check (4/11/24)  STG: pt will be independent in HEP & symptom management     LTGs  Strength: Pt will improve B LE Strength to >/= 4+/5 to increase functional performance, tolerance to activity, and enhancing pt safety to particpate in ADLs and IADLs. (Improving)      Gait: pt will demonstrate normal mechanics without pain during gait and performance of stairs, allowing for pt to return to navigating the community.       ABC: Patient will improve initial score > 70%; (Parkinson's Disease: Cut-off score of < 69% is predictive of recurrent falls; Stroke (chronic, > 6 months post); Cut-off score of 81.1% indicates relative certainty that the patient does not have a  history of falls.) Baseline: 45% (NT)      MiniBest: patient will show [place]BASTI[place]N of 5.5 points or >16 to decrease fall risk, improve dynamic gait, and balance. Baseline: 13/28 --> 18/28     TUG: pt will complete TUG within 12 seconds or less (indicative of higher fall risk) in order to INC balance and enhance safety during ADLs/ IADLs. (> or equal to 12 seconds indicates high risk for falls in older adults. 11-20 seconds is normal for the  frail and elderly.) OR MCID 3.4s Baseline 13.69 sec --> 10.21s     TUG Cog: pt will complete the TUG Cog with a difference of less than 4.5s (Difference between TUG manual and Tug is > 4.5 sec, this indicates an increased risk of falls Healthy adults):  Baseline: 28.5s --> 15.93 (unable to name more than 2 subtractions)     5xSTS: pt will perform 5x sit to  < 15 seconds to decrease fall risk. OR MCID 2.3s Baseline 19.89  sec --> 15.42s     Stairs: pt will ascend/descend step over step (6in step) with proper gait mechanics and use of <1HR to promote functional mobility and improve functional performance. (NT)

## 2024-04-18 ENCOUNTER — TREATMENT (OUTPATIENT)
Dept: PHYSICAL THERAPY | Facility: CLINIC | Age: 74
End: 2024-04-18
Payer: MEDICARE

## 2024-04-18 DIAGNOSIS — G20.A1 PARKINSON DISEASE (MULTI): Primary | ICD-10-CM

## 2024-04-18 PROCEDURE — 97110 THERAPEUTIC EXERCISES: CPT | Mod: GP

## 2024-04-18 PROCEDURE — 97112 NEUROMUSCULAR REEDUCATION: CPT | Mod: GP

## 2024-04-18 ASSESSMENT — PAIN DESCRIPTION - DESCRIPTORS: DESCRIPTORS: TIGHTNESS

## 2024-04-18 ASSESSMENT — PAIN SCALES - GENERAL: PAINLEVEL_OUTOF10: 8

## 2024-04-18 ASSESSMENT — PAIN - FUNCTIONAL ASSESSMENT
PAIN_FUNCTIONAL_ASSESSMENT: 0-10
PAIN_FUNCTIONAL_ASSESSMENT: 0-10

## 2024-04-18 NOTE — PROGRESS NOTES
Physical Therapy Treatment    Patient Name: Danial Salazar  MRN: [id number]  Today's Date: 4/18/2024  Time Calculation  Start Time: 0300  Stop Time: 0341  Time Calculation (min): 41 min  Billing:  Treatment:   Therapeutic Exercise:  8  NMR:  33    Insurance:  Visit #: 5  Insurance Reviewed (per information provided by  pre-cert team)  Authorization required:  No, Medicare (Initial send 2/26/24; 2nd send 3/19/24; 3rd send 4/2/24)   Dates: 2/26/24 to 5/26/24    Assessment:  PT Assessment  PT Assessment Results: Decreased mobility, Impaired balance, Pain  Rehab Prognosis: Fair  Evaluation/Treatment Tolerance: Patient tolerated treatment well  pt tolerated treatment with some difficulty - was challenged with weighted walk outs fwd/bwd as well as with PWR moves. Required Mod VC and tactile cues for proper sequencing/positioning. pt needs continued skilled PT to work on dynamic balance and strength to allow return to optimal level of functional mobility and tolerance with activities. pt left session with all questions answered.     Plan:  OP PT Plan  Treatment/Interventions: Cryotherapy, Education/ Instruction, Manual therapy, Neuromuscular re-education, Self care/ home management, Taping techniques, Therapeutic activities, Therapeutic exercises, Biofeedback, Gait training  PT Plan: Skilled PT  Onset Date: 02/19/24  NV: Weighted walk outs     Current Problem  Problem List Items Addressed This Visit         Codes    Parkinson disease (Multi)    -  Primary G20.A1            General  PT  Visit  PT Received On: 04/18/24  Response to Previous Treatment: Compliant with home exercise program  General  Reason for Referral: Parkinson's  Referred By: MD Esa  Past Medical History Relevant to Rehab: occassionally high blood pressure (according to pt), PD  Preferred Learning Style: visual, verbal, written  General Comment: Reports he has been trying to complete his HEP but has a hard time because all his chairs have armrests at  home. No falls reported.States he has pain when he is on the elliptical in his low back    Subjective    Precautions  Precautions  STEADI Fall Risk Score (The score of 4 or more indicates an increased risk of falling): yes  Hearing/Visual Limitations: [place]  Medical Precautions: Fall precautions  Pain  Pain Assessment  Pain Assessment: 0-10  Pain Score: 8  Pain Type: Acute pain  Pain Location: Leg  Pain Orientation: Right, Left  Pain Descriptors: Tightness  Pain Frequency: Constant/continuous    Objective   Posture  Postural Control  Posture Comment: Fwd head, rounded shoulders,    Gait: ambulates for 80+ft with SPC in clinic with shuffling gait, decreased knee extension throughout gait cycle; cane in the R extremity, BL knee flexion through entire gait cycle; decreased push off and swing phase; festination      Treatments:  Therapeutic Exercise  Therapeutic Exercise Performed: Yes  Therapeutic Exercise Activity 1: Nustep for 8min with goal to steps per minute > 70 -90    Balance/Neuromuscular Re-Education:  Max VC and Visual cues for patient to perform correctly  1: PWR Up Standing x10  2: PWR Rock x10 seated  3: PWR Twist x10 seated  4: PWR Step x10 modified seated   5. Recoil band walk fwd/bwd with 10#; CGA to Min A for 3 minutes   6. Recoil band squats 3x10; 10#   7. Recoil band heel raises 3x10; 10#     OP EDUCATION:  Outpatient Education  Individual(s) Educated: Patient  Education Provided: Anatomy, Body Mechanics, Home Safety, Home Exercise Program, Physiology, POC, Posture  Patient/Caregiver Demonstrated Understanding: yes  Patient Response to Education: Patient/Caregiver Performed Return Demonstration of Exercises/Activities, Patient/Caregiver Asked Appropriate Questions, Patient/Caregiver Verbalized Understanding of Information

## 2024-04-22 ENCOUNTER — TREATMENT (OUTPATIENT)
Dept: PHYSICAL THERAPY | Facility: CLINIC | Age: 74
End: 2024-04-22
Payer: MEDICARE

## 2024-04-22 DIAGNOSIS — G20.A1 PARKINSON DISEASE (MULTI): Primary | ICD-10-CM

## 2024-04-22 PROCEDURE — 97110 THERAPEUTIC EXERCISES: CPT | Mod: GP

## 2024-04-22 PROCEDURE — 97112 NEUROMUSCULAR REEDUCATION: CPT | Mod: GP

## 2024-04-22 ASSESSMENT — PAIN SCALES - GENERAL: PAINLEVEL_OUTOF10: 8

## 2024-04-22 ASSESSMENT — PAIN - FUNCTIONAL ASSESSMENT: PAIN_FUNCTIONAL_ASSESSMENT: 0-10

## 2024-04-22 ASSESSMENT — PAIN DESCRIPTION - DESCRIPTORS: DESCRIPTORS: TIGHTNESS

## 2024-04-22 NOTE — PROGRESS NOTES
Physical Therapy Treatment    Patient Name: Danial Salazar  MRN: [id number]  Today's Date: 4/22/2024  Time Calculation  Start Time: 0136  Stop Time: 0215  Time Calculation (min): 39 min  Billing:  Treatment:   Therapeutic Exercise:  12  NMR:  27  Manual:     Gait:       Insurance:  Visit #: 6  Insurance Reviewed (per information provided by  pre-cert team)  Authorization required:  No, Medicare (Initial send 2/26/24; 2nd send 3/19/24; 3rd send 4/2/24)   Dates: 2/26/24 to 5/26/24    Assessment:  PT Assessment  PT Assessment Results: Decreased mobility, Impaired balance, Pain  Rehab Prognosis: Fair  Evaluation/Treatment Tolerance: Patient tolerated treatment well  Pt came to session late. pt tolerated abbreviated session with some difficulty, had difficulty with weighted squats although had increased endurance for weighted walkouts with increase in weight to 20#. Had multiple rest breaks during the session 2/2 to feeling that he was losing balance although he was in a well controlled environment with therapist at [facility]. pt needs continued skilled PT to work on gait/dynamic balance/strength to allow return to optimal level of functional mobility and tolerance with activities. pt left session with all questions answered.     Plan:  OP PT Plan  Treatment/Interventions: Cryotherapy, Education/ Instruction, Manual therapy, Neuromuscular re-education, Self care/ home management, Taping techniques, Therapeutic activities, Therapeutic exercises, Biofeedback, Gait training  PT Plan: Skilled PT  Onset Date: 02/19/24  NV: Blaze Pod Stepping; PWR moves; Weighted walk outs     Current Problem  Problem List Items Addressed This Visit         Codes    Parkinson disease (Multi)    -  Primary G20.A1          General  PT  Visit  PT Received On: 04/22/24  Response to Previous Treatment: Patient with no complaints from previous session., Partial compliance with home exercise program  General  Reason for Referral: Parkinson's  Referred By:  Progress Note    SUBJECTIVE:    Patient seen for f/u of UTI (urinary tract infection).  She resting in bed eating breakfast. No complaints .   at side.  Ready to go home she says    ROS:   Constitutional: negative  for fevers, and negative for chills.  Respiratory: negative for shortness of breath, negative for cough, and negative for wheezing  Cardiovascular: negative for chest pain, and negative for palpitations  Gastrointestinal: negative for abdominal pain, negative for nausea,negative for vomiting, negative for diarrhea, and negative for constipation     All other systems were reviewed with the patient and are negative unless otherwise stated in HPI      OBJECTIVE:      Vitals:   Vitals:    02/20/24 0718   BP: (!) 162/86   Pulse: 94   Resp: 20   Temp: 99.1 °F (37.3 °C)   SpO2: 95%     Weight - Scale: 49.2 kg (108 lb 6.4 oz)   Height: 147.3 cm (4' 10\")     Weight  Wt Readings from Last 3 Encounters:   02/20/24 49.2 kg (108 lb 6.4 oz)   02/14/24 48.1 kg (105 lb 15.6 oz)   10/16/23 47.6 kg (105 lb)     Body mass index is 22.66 kg/m².    24HR INTAKE/OUTPUT:      Intake/Output Summary (Last 24 hours) at 2/20/2024 0816  Last data filed at 2/20/2024 0727  Gross per 24 hour   Intake 3099 ml   Output --   Net 3099 ml     -----------------------------------------------------------------  Exam:    GEN:    Awake, alert and oriented x3.   EYES:  EOMI, pupils equal   NECK: Supple. No lymphadenopathy.  No carotid bruit  CVS:    regular but tachycardic, no audible murmur  PULM:  CTA, no wheezes, rales or rhonchi, no acute respiratory distress  ABD:    Bowels sounds normal.  Abdomen is soft.  No distention.  no tenderness to palpation.   EXT:   no edema bilaterally .  No calf tenderness.   NEURO: Moves all extremities.  Motor and sensory are grossly intact  SKIN:  No rashes.  No skin lesions.    -----------------------------------------------------------------    Diagnostic Data:      Complete Blood Count:   Recent Labs  MD Esa  Past Medical History Relevant to Rehab: occassionally high blood pressure (according to pt), PD  Preferred Learning Style: visual, verbal, written  General Comment: Reports no falls; has been unable to complete much of his power moves    Subjective    Precautions  Precautions  STEADI Fall Risk Score (The score of 4 or more indicates an increased risk of falling): yes  Hearing/Visual Limitations: [place]  Medical Precautions: Fall precautions  Pain  Pain Assessment  Pain Assessment: 0-10  Pain Score: 8  Pain Type: Acute pain  Pain Location: Leg (Back)  Pain Orientation: Right, Left  Pain Descriptors: Tightness  Pain Frequency: Constant/continuous    Objective   Posture  Postural Control  Posture Comment: Fwd head, rounded shoulders,    Gait: ambulates for 50+ft with SPC in clinic with shuffling gait, decreased knee extension throughout gait cycle; cane in the R extremity, BL knee flexion through entire gait cycle; decreased push off and swing phase; festination      Treatments:  Therapeutic Exercise  Therapeutic Exercise Performed: Yes  1. Nustep for 8min with goal to steps per minute > 70 -90  2. STS from Standard Chair x10 with CGA      Balance/Neuromuscular Re-Education:  Max VC and Visual cues for patient to perform correctly  1: PWR Up Standing x10  2: PWR Rock x10 seated  3: PWR Twist x10 seated  4: PWR Step x10 modified seated   5. Recoil band walk fwd/bwd with 20#; CGA to Min A for 5 minutes   6. Recoil band squats 3x10; 20#   7. Recoil band heel raises 3x10; 10#     OP EDUCATION:  Outpatient Education  Individual(s) Educated: Patient  Education Provided: Anatomy, Body Mechanics, Home Safety, Home Exercise Program, Physiology, POC, Posture  Patient/Caregiver Demonstrated Understanding: yes  Patient Response to Education: Patient/Caregiver Performed Return Demonstration of Exercises/Activities, Patient/Caregiver Asked Appropriate Questions, Patient/Caregiver Verbalized Understanding of Information

## 2024-04-30 ENCOUNTER — TREATMENT (OUTPATIENT)
Dept: PHYSICAL THERAPY | Facility: CLINIC | Age: 74
End: 2024-04-30
Payer: MEDICARE

## 2024-04-30 DIAGNOSIS — G20.A1 PARKINSON DISEASE (MULTI): ICD-10-CM

## 2024-04-30 PROCEDURE — 97110 THERAPEUTIC EXERCISES: CPT | Mod: GP,CQ

## 2024-04-30 PROCEDURE — 97112 NEUROMUSCULAR REEDUCATION: CPT | Mod: GP,CQ

## 2024-04-30 NOTE — PROGRESS NOTES
Physical Therapy Treatment    Patient Name: Danial Salazar  MRN: [id number]  Today's Date: 4/30/2024  Time Calculation  Start Time: 0232  Stop Time: 0312  Time Calculation (min): 40 min  Billing:  Treatment:   Therapeutic Exercise:  12  NMR:  28  Manual:     Gait:       Insurance:  Visit #: 7  Insurance Reviewed (per information provided by  pre-cert team)  Authorization required:  No, Medicare (Initial send 2/26/24; 2nd send 3/19/24; 3rd send 4/2/24)   Dates: 2/26/24 to 5/26/24    Assessment:     Patient tolerated treatment progression fair, with increased fatigue with resisted ambulation, with increased step length in each direction with verbal cueing. Patient became stuck with retro ambulation x 2, but resumed gait with weight shifts to metronome. Improved fluidity of gait with added metronome this visit. Positive response to progression of blaze pods, with increased coordination with each set. Challenged with coordination and mobility with PWR moves. Patient encouraged to do his HEP daily for better outcomes. Patient needs continued skilled PT to work on gait/dynamic balance/strength to allow return to optimal level of functional mobility and tolerance with activities.     Plan:   Continue per POC  NV: Weighted walk outs, Aidan Chi    Current Problem  Problem List Items Addressed This Visit         Codes    Parkinson disease (Multi)    -  Primary G20.A1          General  Reason for Referral: Parkinson's  Referred By: MD Esa  Past Medical History Relevant to Rehab: occassionally high blood pressure (according to pt), PD  Preferred Learning Style: visual, verbal, written       Subjective  Feels pain relief when he lies on his back.     HEP Compliance: Not often    Precautions   STEADI Fall Risk Score (The score of 4 or more indicates an increased risk of falling): Yes; Fall Risk  Hearing/Visual Limitations: [place]  Medical Precautions: Fall precautions    Pain   Pain Assessment 5/10  Location/Type: Lumbar, glutes,  "BLE stiffness    Objective   Posture FWD head, rounded shoulders   Gait: ambulates for 50+ft with SPC in clinic with shuffling gait, decreased knee extension throughout gait cycle; cane in the R extremity, BL knee flexion through entire gait cycle; decreased push off and swing phase; festination    1# applied to RUE to decrease tremors    Treatments:  Therapeutic Exercise  Therapeutic Exercise Performed: Yes  1. Nustep for 8min with goal to steps per minute > 70 -90  2/ STS from standard chair w/ CGA x 10       Balance/Neuromuscular Re-Education:  Max VC and Visual cues for patient to perform correctly  1: PWR Up Standing x10  2: PWR Rock x10 seated  3: PWR Twist x10 seated  4: PWR Step x10 modified seated   5. Airex HR/TR 2x10  6. CC Ambulation w/ std cane Peg 1 w/ CGA FWD and min A Retro w/ Metronome 32 bpm 3 laps  7. Blaze Pods in // bars 5 pods 2 colors w/ CGA 4 cycles 30\"/30\" on/off 8 hits max      OP EDUCATION:  postural awareness, blaze pods progression, verbal cues for increased step length, especially w/ retro ambulation, weight shifts and metronome for getting \"unstuck\"  "

## 2024-05-07 ENCOUNTER — OFFICE VISIT (OUTPATIENT)
Dept: NEUROLOGY | Facility: CLINIC | Age: 74
End: 2024-05-07
Payer: MEDICARE

## 2024-05-07 VITALS
SYSTOLIC BLOOD PRESSURE: 128 MMHG | HEIGHT: 69 IN | BODY MASS INDEX: 27.52 KG/M2 | TEMPERATURE: 97.1 F | HEART RATE: 66 BPM | DIASTOLIC BLOOD PRESSURE: 74 MMHG | RESPIRATION RATE: 20 BRPM | WEIGHT: 185.8 LBS

## 2024-05-07 DIAGNOSIS — G20.C PARKINSONISM, UNSPECIFIED PARKINSONISM TYPE (MULTI): Primary | ICD-10-CM

## 2024-05-07 PROCEDURE — 99204 OFFICE O/P NEW MOD 45 MIN: CPT | Performed by: PSYCHIATRY & NEUROLOGY

## 2024-05-07 PROCEDURE — 1125F AMNT PAIN NOTED PAIN PRSNT: CPT | Performed by: PSYCHIATRY & NEUROLOGY

## 2024-05-07 PROCEDURE — 1159F MED LIST DOCD IN RCRD: CPT | Performed by: PSYCHIATRY & NEUROLOGY

## 2024-05-07 PROCEDURE — G2211 COMPLEX E/M VISIT ADD ON: HCPCS | Performed by: PSYCHIATRY & NEUROLOGY

## 2024-05-07 PROCEDURE — 1160F RVW MEDS BY RX/DR IN RCRD: CPT | Performed by: PSYCHIATRY & NEUROLOGY

## 2024-05-07 PROCEDURE — 1036F TOBACCO NON-USER: CPT | Performed by: PSYCHIATRY & NEUROLOGY

## 2024-05-07 RX ORDER — CARBIDOPA AND LEVODOPA 25; 100 MG/1; MG/1
TABLET ORAL
Qty: 270 TABLET | Refills: 3 | Status: SHIPPED | OUTPATIENT
Start: 2024-05-07

## 2024-05-07 ASSESSMENT — PAIN SCALES - GENERAL: PAINLEVEL: 7

## 2024-05-07 NOTE — PATIENT INSTRUCTIONS
"It was a pleasure seeing you today.     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 (Is there a non dopamine blocking medication as an alternative).  Please see psychiatry.     I would like you to start a medication called Sinemet (carbidopa-levadopa) 25-100mg start with 1/2 tab three times daily with meals (or small meal) x 7 days, then 1 tab three times daily and continue. Some potential adverse effects are dizziness, nausea, confusion, or hallucinations-however, unlikely to occur at small doses like this.  This medication should help with symptoms like tremor, slow movements and stiffness.      Please see a primary care doctor.    Continue Physical therapy.     Please make a follow up appointment in 3-4 months.     For any urgent issues or needing to speak to a medical assistant please call [phone], option 6 during our office hours Monday-Friday 8am-4pm, and leave a voicemail with your concern.  My office will try to reach back you as soon as possible within 24 (business) hours.  If you have an emergency please call 911 or visit a local urgent care or nearest emergency room.      Please understand that [employer] is a useful communication tool for simple \"normal\" results or a refill request but I would not recommend using this tool for emergent or urgent issues or for conversations with me.  I am happy to ask my staff to rearrange a follow up visit or a virtual visit sooner than requested if appropriate for your care.    "

## 2024-05-07 NOTE — PROGRESS NOTES
Consultation:   Subjective     Danial Salazar is a 73 y.o. year old RH male here for Parkinson disease consult.  Referred by Dr. Kevin See.    HPI  Patient is accompanied by his daughter.    Patient brought his MRI brain from October 2023 which was normal.  Patient also had a DaTscan from September 2023 which was normal.  Patient's family brought in records from neurology dated November.  His gait seems to be worsening he is falling more.  His right hand shaking seems worse.  He started propranolol which does not seem to help his tremor.  He is on Abilify 5 mg daily and Depakote.  He has a history of bipolar disorder, lumbar disease and chronic low back pain memory has been poor.    Right hand tremor began 5 years ago once in a while, seems worse once on Abilify.   They think he has been on abilify for many years.   Psychiatry NP talked to Dr. See and not comfortable changing his medications.  No constipation. No dizziness.  When drinking his tremors is annoying.    He was in [facility].   FH: mother may have had memory issues. He does not know his brother's health.  He has not seen a Primary care doctor in decades.   He had epidural injections for his back.   Review of Systems    There is no problem list on file for this patient.    No past medical history on file.  No past surgical history on file.  Social History     Tobacco Use    Smoking status: Never    Smokeless tobacco: Never   Substance Use Topics    Alcohol use: Never     family history is not on file.  No current outpatient medications on file.  Not on File  There were no vitals taken for this visit.  Neurological Exam/Physical Exam:    Constitutional: General appearance: no acute distress. Pleasant.  In wheelchair. Hypomimia.   Auscultation of Heart: Regular rate and rhythm, no murmurs, normal S1 and S2.   Carotid Arteries: no bruits  Peripheral Vascular Exam: No swelling, edema or tenderness to palpation in extremities  Mental status: no  distress, alert, interactive and cooperative. Affect is appropriate.   Orientation: oriented to person, oriented to place and oriented to time.   Memory: recent memory intact and remote memory intact.   Attention: normal attention   Language: normal comprehension  Hypophonia.  Eyes: The ophthalmoscopic examination was normal.   Cranial nerve II: Visual fields full to confrontation.   Cranial nerves III, IV, and VI: Pupils round, equally reactive to light; EOMs slow in all directions of gaze.   Cranial Nerve V: Facial sensation intact to LT bilaterally.   Cranial nerve VII: no facial droop  Cranial nerve VIII: Hearing is intact  Cranial nerves IX and X: Palate elevates symmetrically.   Cranial nerve XI: Shoulder shrug intact.   Cranial nerve XII: Tongue is midline.  Motor:  Muscle bulk was normal in both upper and lower extremities.    No atrophy.   Strength is normal.  Resting tremor of the hands seen on Right side, mild rigidity in all ext but moderate on RUE and RLE.  Has moderate bradykinesia on RUE and RLE.   Deep Tendon Reflexes: left biceps 2+ , right biceps 2+, left triceps 2+, right triceps 2+, left brachioradialis 2+, right brachioradialis 2+, left patella 2+, right patella 2+, left ankle jerk 1+, right ankle jerk 1+   Plantar Reflex: Toes downgoing to plantar stimulation on the left. Toes downgoing to plantar stimulation on the right.   Sensory Exam: Normal to vibratory sensation  Coordination:  no limb dystaxia   Gait:  both knees are buckled and shuffling, leans toward the right. Severe postural instability.        Labs:  CBC:   Lab Results   Component Value Date    WBC 5.0 12/14/2021    HGB 17.0 12/14/2021    HCT 48.4 12/14/2021     12/14/2021     BMP:   Lab Results   Component Value Date     09/13/2022    K 4.4 09/13/2022     09/13/2022    CO2 28 09/13/2022    BUN 17 09/13/2022    CREATININE 1.22 09/13/2022    CALCIUM 9.4 09/13/2022     LFT:   Lab Results   Component Value Date     ALKPHOS 69 12/14/2021    BILITOT 0.9 12/14/2021    BILIDIR 0.1 12/14/2021    PROT 6.3 (L) 12/14/2021    ALBUMIN 4.1 12/14/2021    ALT 14 12/14/2021    AST 16 12/14/2021         Assessment/Plan   Problem List Items Addressed This Visit    None  Visit Diagnoses         Codes    Parkinsonism, unspecified Parkinsonism type (Multi)    -  Primary G20.C        This is a chronic neurologic condition that requires ongoing care and monitoring. This is a complex, serious condition that needs long term care going forward. Between myself and the patient we will be changing direction of care depending on responses to treatment.   Today we discussed medication options, non medication options for management and various other symptoms that are in relation to this disease.  I will continue to be involved in the care of this patient.      May be lumbar disease contributing and Abilify is contributing to his symptoms as well.   Likely also has Parkinson's disease given the progression.   Trial sinemet up to 1 tab TID.   DaTscan and MRI brain are normal.  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  PT .  See PCP for preventative care.

## 2024-05-09 ENCOUNTER — DOCUMENTATION (OUTPATIENT)
Dept: PHYSICAL THERAPY | Facility: CLINIC | Age: 74
End: 2024-05-09
Payer: COMMERCIAL

## 2024-05-09 ENCOUNTER — APPOINTMENT (OUTPATIENT)
Dept: PHYSICAL THERAPY | Facility: CLINIC | Age: 74
End: 2024-05-09
Payer: MEDICARE

## 2024-05-09 NOTE — PROGRESS NOTES
Physical Therapy                 Therapy Communication Note    Patient Name: Danial Salazar  MRN: [id number]  Today's Date: 5/9/2024     Discipline: Physical Therapy    Missed Time: Cancel    Comment: Pt cancelled due to having an off day.

## 2024-05-16 ENCOUNTER — TREATMENT (OUTPATIENT)
Dept: PHYSICAL THERAPY | Facility: CLINIC | Age: 74
End: 2024-05-16
Payer: MEDICARE

## 2024-05-16 DIAGNOSIS — G20.A1 PARKINSON DISEASE (MULTI): Primary | ICD-10-CM

## 2024-05-16 PROCEDURE — 97112 NEUROMUSCULAR REEDUCATION: CPT | Mod: GP

## 2024-05-16 PROCEDURE — 97110 THERAPEUTIC EXERCISES: CPT | Mod: GP

## 2024-05-16 ASSESSMENT — PAIN SCALES - GENERAL: PAINLEVEL_OUTOF10: 6

## 2024-05-16 ASSESSMENT — PAIN DESCRIPTION - DESCRIPTORS: DESCRIPTORS: TIGHTNESS

## 2024-05-16 ASSESSMENT — PAIN - FUNCTIONAL ASSESSMENT: PAIN_FUNCTIONAL_ASSESSMENT: 0-10

## 2024-05-16 NOTE — PROGRESS NOTES
Physical Therapy Treatment    Patient Name: Danial Salazar  MRN: [id number]  Today's Date: 5/16/2024  Time Calculation  Start Time: 0230  Stop Time: 0308  Time Calculation (min): 38 min  Billing:  Treatment:   Therapeutic Exercise:  23  NMR:  15    Insurance  Visit #:  8      Assessment:  PT Assessment  PT Assessment Results: Decreased mobility, Impaired balance, Pain, Decreased strength  Rehab Prognosis: Fair  Evaluation/Treatment Tolerance: Patient limited by fatigue  pt tolerated treatment with difficulty. Had increased fatigue and decreased activity tolerance throughout the session. Required increased rest breaks. Tremors in the R UE increased by end of session. pt needs continued skilled PT to work on gait/dynamic stability to allow return to optimal level of functional mobility and tolerance with activities. pt left session with all questions answered.     Plan:  OP PT Plan  Treatment/Interventions: Cryotherapy, Education/ Instruction, Manual therapy, Neuromuscular re-education, Self care/ home management, Taping techniques, Therapeutic activities, Therapeutic exercises, Biofeedback, Gait training  PT Plan: Skilled PT  Onset Date: 02/19/24  NV: Work on PWR STS with blaze pod touch   Next Neurology Appt: 9/10/24    Current Problem  Problem List Items Addressed This Visit             ICD-10-CM    Parkinson disease (Multi) - Primary G20.A1       General  PT  Visit  PT Received On: 05/16/24  Response to Previous Treatment: Patient reporting fatigue but able to participate.  General  Reason for Referral: Parkinson's  Referred By: MD Esa  Past Medical History Relevant to Rehab: occassionally high blood pressure (according to pt), PD  Preferred Learning Style: visual, verbal, written  General Comment: Per pt report last week unable to come 2/2 having a fall - states his legs just gave out from under him. He states he think he is getting worse. Does not believe the medicine has been helping him that the neurologist  gave him    Subjective    Precautions  Precautions  STEADI Fall Risk Score (The score of 4 or more indicates an increased risk of falling): yes  Hearing/Visual Limitations: [place]  Medical Precautions: Fall precautions  Pain  Pain Assessment  Pain Assessment: 0-10  Pain Score: 6  Pain Type: Acute pain  Pain Location: Knee  Pain Orientation: Right, Left  Pain Descriptors: Tightness  Pain Frequency: Constant/continuous    Objective   Posture  Posture FWD head, rounded shoulders    Gait: ambulates for 75+ft with SPC in clinic with shuffling gait, decreased knee extension throughout gait cycle; cane in the R extremity, BL knee flexion through entire gait cycle; decreased push off and swing phase; festination      Treatments:  Therapeutic Exercise  Therapeutic Exercise Performed: Yes  1. Nustep for 8min with goal to steps per minute > 70 -90  2. Repeated STS throughout session from standard chair w/ CGA x5   3. Shuttle 25# BL LE Leg press 2x10 BL   4. Shuttle 25# SL leg press 2x10 BL     Balance/Neuromuscular Re-Education:  Max VC and Visual cues for patient to perform correctly  1: PWR Up Standing x10  2: PWR Rock x10 seated  3: PWR Twist x10 seated  4: PWR Step x10 modified seated   5. Airex HR/TR 2x10    OP EDUCATION:  Outpatient Education  Individual(s) Educated: Patient  Education Provided: Anatomy, Body Mechanics, Home Exercise Program, Home Safety, POC, Posture  Patient/Caregiver Demonstrated Understanding: yes  Patient Response to Education: Patient/Caregiver Performed Return Demonstration of Exercises/Activities, Patient/Caregiver Asked Appropriate Questions, Patient/Caregiver Verbalized Understanding of Information

## 2024-05-23 ENCOUNTER — TREATMENT (OUTPATIENT)
Dept: PHYSICAL THERAPY | Facility: CLINIC | Age: 74
End: 2024-05-23
Payer: MEDICARE

## 2024-05-23 DIAGNOSIS — G20.A1 PARKINSON DISEASE (MULTI): ICD-10-CM

## 2024-05-23 PROCEDURE — 97110 THERAPEUTIC EXERCISES: CPT | Mod: GP,CQ

## 2024-05-23 PROCEDURE — 97112 NEUROMUSCULAR REEDUCATION: CPT | Mod: GP,CQ

## 2024-05-23 NOTE — PROGRESS NOTES
Physical Therapy Treatment    Patient Name: Danial Salazar  MRN: [id number]  Today's Date: 5/23/2024   Start Time: 4:00  Stop Time: 4:45  Time Calculation (min): 45 min    Billing:  Treatment:   Therapeutic Exercise:  15   NMR:   30    Insurance  Visit #:  9    Insurance Reviewed (per information provided by  pre-cert team)  Authorization required:  No, Medicare (Initial send 2/26/24; 2nd send 3/19/24; 3rd send 4/2/24)   Dates: 2/26/24 to 5/26/24; 5/27/24 to 7/27/24    Assessment:   Patient  tolerated treatment fair, with improved eccentric control, coordination and velocity with exercise progression with verbal cueing and demonstration. Patient was fatigued at end of session, Needs continued work on mobility, gait training, balance and velocity.     Plan: See Re-Check   OP PT Plan  Treatment/Interventions: Cryotherapy, Education/ Instruction, Manual therapy, Neuromuscular re-education, Self care/ home management, Taping techniques, Therapeutic activities, Therapeutic exercises, Biofeedback, Gait training  PT Plan: Skilled PT  PT Frequency: 1 time per week  Duration: 6 weeks  Onset Date: 02/19/24  Certification Period Start Date: 05/27/24  Certification Period End Date: 07/27/24  Rehab Potential: Fair  Plan of Care Agreement: Patient   NV add Aiadn Chi w/ with blaze pod touch   Next Neurology Appt: 9/10/24    Current Problem  Problem List Items Addressed This Visit             ICD-10-CM       Neuro    Parkinson disease (Multi) G20.A1       General   PT  Visit  PT Received On: 05/16/24  Response to Previous Treatment: Patient reporting fatigue but able to participate.  General  Reason for Referral: Parkinson's  Referred By: MD Esa  Past Medical History Relevant to Rehab: occassionally high blood pressure (according to pt), PD  Preferred Learning Style: visual, verbal, written       Subjective  Patient reports he is taking his new medication. Walking has gotten more difficult.     Precautions   STEADI Fall Risk  Score (The score of 4 or more indicates an increased risk of falling): yes  Hearing/Visual Limitations: [place]  Medical Precautions: Fall precautions    Pain   L > R glute and todd calves 6-7/10      Objective   Posture FWD head, rounded shoulders  Gait: ambulates for 75+ft with SPC in clinic with shuffling gait, decreased knee extension throughout gait cycle; cane in the R extremity, BL knee flexion through entire gait cycle; decreased push off and swing phase; festination  Function: Using the eliptical with increased glut pain after. Walking around the house without a cane. Has been furniture cruising      Goals: Re-Check (4/11/24)  STG: pt will be independent in HEP & symptom management     LTGs  Strength: Pt will improve B LE Strength to >/= 4+/5 to increase functional performance, tolerance to activity, and enhancing pt safety to particpate in ADLs and IADLs. (Improving)      Gait: pt will demonstrate normal mechanics without pain during gait and performance of stairs, allowing for pt to return to navigating the community.       ABC: Patient will improve initial score > 70%; (Parkinson's Disease: Cut-off score of < 69% is predictive of recurrent falls; Stroke (chronic, > 6 months post); Cut-off score of 81.1% indicates relative certainty that the patient does not have a  history of falls.) Baseline: 45% (NT)      MiniBest: patient will show [place]STI[place]N of 5.5 points or >16 to decrease fall risk, improve dynamic gait, and balance. Baseline: 13/28 --> 18/28     TUG: pt will complete TUG within 12 seconds or less (indicative of higher fall risk) in order to INC balance and enhance safety during ADLs/ IADLs. (> or equal to 12 seconds indicates high risk for falls in older adults. 11-20 seconds is normal for the frail and elderly.) OR MCID 3.4s Baseline 13.69 sec --> 10.21s     TUG Cog: pt will complete the TUG Cog with a difference of less than 4.5s (Difference between TUG manual and Tug is > 4.5 sec, this indicates an  "increased risk of falls Healthy adults):  Baseline: 28.5s --> 15.93 (unable to name more than 2 subtractions)     5xSTS: pt will perform 5x sit to  < 15 seconds to decrease fall risk. OR MCID 2.3s Baseline 19.89  sec --> 15.42s     Stairs: pt will ascend/descend step over step (6in step) with proper gait mechanics and use of <1HR to promote functional mobility and improve functional performance. (NT)    Treatments:  Therapeutic Exercise  Therapeutic Exercise Performed: Yes  1. Nustep for 8min with goal to steps per minute > 70 -90  2. Repeated STS throughout session from standard chair w/ CGA x5   3. Shuttle 25# SL leg press 2x10 BL     Balance/Neuromuscular Re-Education:  Max VC and Visual cues for patient to perform correctly  1: PWR Up Standing x10  2: PWR Rock x10 seated  3: PWR Twist x10 seated  4: PWR Step x10 modified seated   5. Airex HR/TR 2x10  6.  PWR STS on Airex with blaze pod touch 4 pods 2 colors 60\" on/off for 5 cycles w/ CGA  7. . Shuttle  w/ wobble board 50# SL leg press 2x10 BL       OP EDUCATION:   Eccentric control, dual task coordination, velocity with ambulation and blaze pod hits  "

## 2024-05-30 ENCOUNTER — TREATMENT (OUTPATIENT)
Dept: PHYSICAL THERAPY | Facility: CLINIC | Age: 74
End: 2024-05-30
Payer: MEDICARE

## 2024-05-30 DIAGNOSIS — G20.A1 PARKINSON DISEASE (MULTI): ICD-10-CM

## 2024-05-30 PROCEDURE — 97110 THERAPEUTIC EXERCISES: CPT | Mod: GP,CQ

## 2024-05-30 PROCEDURE — 97112 NEUROMUSCULAR REEDUCATION: CPT | Mod: GP,CQ

## 2024-05-30 NOTE — PROGRESS NOTES
Physical Therapy Treatment    Patient Name: Danial Salazar  MRN: [id number]  Today's Date: 5/30/2024  Time Calculation  Start Time: 0232  Stop Time: 0315  Time Calculation (min): 43 min    Billing:  Treatment:   Therapeutic Exercise:  14  NMR:  29    Insurance  Visit #:  10      Assessment:   Patient  tolerated treatment well, with improved eccentric control and velocity with STS with blaze pods this visit. Challenged with progression of Aidan Chi this visit, with improved balance, increased weight shifts and improved trunk rotation with repetition and verbal cueing.  Performed progression on shuttle with improved quad activation and ankle stabilization with tactile and verbal cueing. Patient performed all exercises with decreased fatigue and improved standing tolerance. , Needs continued work on mobility, gait training, balance and velocity.     Plan:   NV add Aidan Chi w/ with blaze pod touch w/ weight shifts.  Next Neurology Appt: 9/10/24    Current Problem  Problem List Items Addressed This Visit             ICD-10-CM       Neuro    Parkinson disease (Multi) G20.A1       General   PT  Visit  PT Received On: 05/16/24  Response to Previous Treatment: Patient reporting fatigue but able to participate.  General  Reason for Referral: Parkinson's  Referred By: MD Esa  Past Medical History Relevant to Rehab: occassionally high blood pressure (according to pt), PD  Preferred Learning Style: visual, verbal, written       Subjective  Patient reprots he is sore from the Eliptical last night. He was on it for 10 minutes at 10 pm. Did not sleep enough last night.     Precautions   STEADI Fall Risk Score (The score of 4 or more indicates an increased risk of falling): yes  Hearing/Visual Limitations: [place]  Medical Precautions: Fall precautions    Pain   L > R glute and todd calves 6-7/10      Objective   Posture FWD head, rounded shoulders  Gait: ambulates for 75+ft with SPC in clinic with shuffling gait, decreased knee extension  "throughout gait cycle; cane in the R extremity, BL knee flexion through entire gait cycle; decreased push off and swing phase; festination  Function: Furniture cruising at his house, not using his cane as much there.  Going up and down the basement stairs are difficult. Uses step to gait while  using 1 hand rail.     Treatments:  Therapeutic Exercise  Therapeutic Exercise Performed: Yes  1. Nustep for 10 min with goal to steps per minute > 70 -90  2. Step Ups w/ 1 HR support x 10 each lead LE    Balance/Neuromuscular Re-Education:  Max VC and Visual cues for patient to perform correctly  1: PWR Up Standing x10  2: PWR Rock x10 seated  3: PWR Twist x10 seated NV  4: PWR Step x10 modified seated  NV  5. Airex HR/TR 2x10  6.  PWR STS on Airex with blaze pod touch 4 pods 2 colors 60\" on/off for 5 cycles w/ CGA  7. . Shuttle  w/ wobble board 50# SL jojo disc 2x10 BL   8.  Shuttle 25# SL jojo discs 2x10 BL   9. Aidan Chi #1-#5 w/ close S at mirror x 5 each  10. Log roll technique to get on/off shuttle w/ min A      OP EDUCATION:   Eccentric control, dual task coordination, velocity with ambulation and blaze pod hits, weight shifts w/ breath control and coordination   " Valtrex Pregnancy And Lactation Text: this medication is Pregnancy Category B and is considered safe during pregnancy. This medication is not directly found in breast milk but it's metabolite acyclovir is present.

## 2024-06-06 ENCOUNTER — TREATMENT (OUTPATIENT)
Dept: PHYSICAL THERAPY | Facility: CLINIC | Age: 74
End: 2024-06-06
Payer: MEDICARE

## 2024-06-06 DIAGNOSIS — G20.A1 PARKINSON DISEASE (MULTI): ICD-10-CM

## 2024-06-06 PROCEDURE — 97530 THERAPEUTIC ACTIVITIES: CPT | Mod: GP

## 2024-06-06 PROCEDURE — 97110 THERAPEUTIC EXERCISES: CPT | Mod: GP

## 2024-06-06 ASSESSMENT — PAIN - FUNCTIONAL ASSESSMENT: PAIN_FUNCTIONAL_ASSESSMENT: 0-10

## 2024-06-06 ASSESSMENT — PAIN SCALES - GENERAL: PAINLEVEL_OUTOF10: 9

## 2024-06-06 NOTE — PROGRESS NOTES
Physical Therapy Re-Assessment     Patient Name: Danial Salazar  MRN: [id number]  Today's Date: 6/6/2024  Time Calculation  Start Time: 0230  Stop Time: 0317  Time Calculation (min): 47 min  Billing:  Treatment:   Therapeutic Exercise:  10  Therapeutic Activity:  37    Insurance  Visit #:  11  Insurance Reviewed (per information provided by  pre-cert team)  Authorization required:  No, Medicare (Initial send 2/26/24; 2nd send 3/19/24; 3rd send 4/2/24)   Dates: 2/26/24 to 5/26/24; 5/27/24 to 7/27/24    Assessment:  PT Assessment  PT Assessment Results: Decreased strength, Impaired balance, Decreased mobility, Pain, Decreased safety awareness  Rehab Prognosis: Fair  Evaluation/Treatment Tolerance: Patient tolerated treatment well  Pt presents to physical therapy as a re-evaluation. At this time pt continues to demonstrate deficits in strength, dynamic balance, gait/stair dysfunction, functional mobility, and tolerance with activity. pt would benefit from continued skilled physical therapy to maximize pt safety, increase tolerance to activity and to address impairments to restore PLOF with ADLs, functional mobility and participation in activities.      Plan:  OP PT Plan  Treatment/Interventions: Cryotherapy, Education/ Instruction, Manual therapy, Neuromuscular re-education, Self care/ home management, Taping techniques, Therapeutic activities, Therapeutic exercises, Biofeedback, Gait training  PT Plan: Skilled PT  PT Frequency: 1 time per week  Duration: 6 weeks  Onset Date: 02/19/24  Certification Period Start Date: 05/27/24  Certification Period End Date: 07/27/24  Rehab Potential: Fair  Plan of Care Agreement: Patient  NV: Continue with dynamic balance and work on upright walking with bigger steps     Current Problem  Problem List Items Addressed This Visit             ICD-10-CM    Parkinson disease (Multi) G20.A1    Relevant Orders    Follow Up In Physical Therapy     General  PT  Visit  PT Received On:  06/06/24  Response to Previous Treatment: Patient with no complaints from previous session.  General  Reason for Referral: Parkinson's  Referred By: MD Esa  Past Medical History Relevant to Rehab: occassionally high blood pressure (according to pt), PD  Preferred Learning Style: visual, verbal, written  General Comment: Per pt report he had 1 minor fall - fell into a wall and not on the ground; was able to use the HR to assist himself up. He has been able to work his way up on the elliptical to 10min.    Subjective    Precautions  Precautions  STEADI Fall Risk Score (The score of 4 or more indicates an increased risk of falling): yes  Hearing/Visual Limitations: [place]  Medical Precautions: Fall precautions  Pain  Pain Assessment  Pain Assessment: 0-10  Pain Score: 9  Pain Type: Acute pain  Pain Location: Buttocks  Pain Orientation: Left  Pain Frequency: Constant/continuous    Objective   Functional Assessments:  Balance Comment: See MiniBest  Stairs Comment: Ascends/Descends 4 6in steps in a reciprocal pattern with BL use of HR; decreased foot clearance to clear steps. CGA with use of gait belt  Bed Mobility Comment: Sup/Sit Mod A; Sit to sup: Min A  - LE assist  Transfers Comment: IND sit/stand and stand/sit     Extremity/Trunk Assessments:  RLE   RLE : Exceptions to WFL  AROM RLE (degrees)  RLE AROM Comment: WNL  PROM RLE (degrees)  RLE PROM Comment: WNL (NT)   Strength RLE  R Hip Flexion: 4+/5 --same  R Hip ABduction: 4/5 --> same -->5/5   R Hip ADduction: 4/5 --> same --> 5/5   R Knee Flexion: 5/5  R Knee Extension: 4+/5 --> 5/5   R Ankle Dorsiflexion: 4/5 --> 5/5  R Ankle Plantar Flexion: 4/5 --> 5/5     LLE   LLE : Exceptions to WFL  AROM LLE (degrees)  LLE AROM Comment: WNL  PROM LLE (degrees)  LLE PROM Comment: WNL (NT)   Strength LLE  L Hip Flexion: 4/5 --> 4+/5  L Hip ABduction: 4/5 --> same -->5/5  L Hip ADduction: 4/5 --> same --> 5/5   L Knee Flexion: 4/5 --> 4+/5 -->5/5   L Knee Extension: 5/5  L  Ankle Dorsiflexion: 5/5  L Ankle Plantar Flexion: 5/5     Gait: ambulates for 75+ft with SPC in clinic with shuffling gait, decreased knee extension throughout gait cycle; cane in the R extremity, BL knee flexion through entire gait cycle; decreased push off and swing phase; festination      HS 90/90 (+) Tight (NT)     4 Stage: NBOS: 10s, R Semi Tandem: 10s, L Tandem: 10s, R Tandem: 10s, SLS L: 10s;  SLS R: <10s     Outcome Measures:  Mini-BEST Balance Evaluation Systems Test  Anticipatory sit to stand: 2  Anticipatory rise to toes: 1  Anticipatory standing: Right  Anticipatory standing lowest score: 1  RPC Compensatory stepping correction-forward: 0  RPC Compensatory stepping correction-backward: 1  Compensatory stepping correction lateral: Left  Compensatory stepping correction lateral lowest score: 0  Sensory orientation stance (feet together); eyes open, firm surface : 2  Sensory orientation stance (feet together); eyes closed, foam surface: 0  DG Change in gait speed: 0  DG Walk with head turns-horizontal: 1  DG Walk with pivot turns: 2  DG Step over obstacles: 1  Time up & Go with dual task: 1  TUG seconds: 13.76  TUG Dual Task seconds: 19.13    Timed Up and Go Test  How many seconds did it take to complete the 5 tasks?: 13.76 seconds  Observe the patient’s postural stability, gait, stride length, and sway. Select All that Apply:: Slow tentative pace, Short strides, Little or no arm swing, Shuffling  TUG Cognitive: 19.13 s with SPC; gait belt; CGA; multiple mistakes to no verbal subtraction by end    Other Measures  5x Sit to Stand: 18.43s with no UE assist; standard chair    Treatments:  Therapeutic Exercise  Therapeutic Exercise Performed: Yes  Therapeutic Exercise Activity 1: Nustep for 8min with goal to steps per minute > 70 -90    Therapeutic Activity  Therapeutic Activity Performed: Yes  Therapeutic Activity 1: Re-Assessment  1. Repeated sit to/from stands from standardized chair height. Required VC for  no use of hands, nose over toes, full upright stand from chair, use of anterior shift with fwd momentum and eccentric control into chair.  2. Functional Performance via gait analysis of TUG and Cog Tug: Verbal cues for sequencing/positioning. 2x10' at CGA with gait belt.   3. Functional mobility via AROM of LE; Verbal cues for sequencing/positioning.  4. Functional Performance via MMT of LE: Hip, knee, ankle; Verbal cues for sequencing/positioning.  5. Educated pt on POC, goals of physical therapy, purpose of physical therapy, as well as the benefits in participating in physical therapy to increase functional mobility and maximize pt safety.    6. Functional Performance via MiniBest - completed at CGA to Min A, gait belt; VC for proper sequencing/positioning. Decreased dynamic balance and reactive postural control.     OP EDUCATION:  Outpatient Education  Individual(s) Educated: Patient  Education Provided: Anatomy, Body Mechanics, POC, Fall Risk, Posture  Risk and Benefits Discussed with Patient/Caregiver/Other: yes  Patient/Caregiver Demonstrated Understanding: yes  Plan of Care Discussed and Agreed Upon: yes  Patient Response to Education: Patient/Caregiver Performed Return Demonstration of Exercises/Activities, Patient/Caregiver Asked Appropriate Questions, Patient/Caregiver Verbalized Understanding of Information    Goals: Re-Check (4/11/24, 6/6/24)   STG: pt will be independent in HEP & symptom management     LTGs  Strength: Pt will improve B LE Strength to >/= 4+/5 to increase functional performance, tolerance to activity, and enhancing pt safety to particpate in ADLs and IADLs. (Improving)      Gait: pt will demonstrate normal mechanics without pain during gait and performance of stairs, allowing for pt to return to navigating the community.       ABC: Patient will improve initial score > 70%; (Parkinson's Disease: Cut-off score of < 69% is predictive of recurrent falls; Stroke (chronic, > 6 months post);  Cut-off score of 81.1% indicates relative certainty that the patient does not have a  history of falls.) Baseline: 45% (NT)      MiniBest: patient will show [place] of 5.5 points or >16 to decrease fall risk, improve dynamic gait, and balance. Baseline: 13/28 --> 18/28 --> 14/28      TUG: pt will complete TUG within 12 seconds or less (indicative of higher fall risk) in order to INC balance and enhance safety during ADLs/ IADLs. (> or equal to 12 seconds indicates high risk for falls in older adults. 11-20 seconds is normal for the frail and elderly.) OR MCID 3.4s Baseline 13.69 sec --> 10.21s --> 13.76     TUG Cog: pt will complete the TUG Cog with a difference of less than 4.5s (Difference between TUG manual and Tug is > 4.5 sec, this indicates an increased risk of falls Healthy adults):  Baseline: 28.5s --> 15.93 (unable to name more than 2 subtractions) --> 19.1s      5xSTS: pt will perform 5x sit to  < 15 seconds to decrease fall risk. OR MCID 2.3s Baseline 19.89  sec --> 15.42s --> 18.43s     Stairs: pt will ascend/descend step over step (6in step) with proper gait mechanics and use of <1HR to promote functional mobility and improve functional performance. (Slow dora)

## 2024-06-13 ENCOUNTER — TREATMENT (OUTPATIENT)
Dept: PHYSICAL THERAPY | Facility: CLINIC | Age: 74
End: 2024-06-13
Payer: MEDICARE

## 2024-06-13 DIAGNOSIS — G20.A1 PARKINSON DISEASE (MULTI): ICD-10-CM

## 2024-06-13 PROCEDURE — 97110 THERAPEUTIC EXERCISES: CPT | Mod: GP

## 2024-06-13 PROCEDURE — 97112 NEUROMUSCULAR REEDUCATION: CPT | Mod: GP

## 2024-06-13 ASSESSMENT — PAIN DESCRIPTION - DESCRIPTORS: DESCRIPTORS: STABBING

## 2024-06-13 ASSESSMENT — PAIN SCALES - GENERAL: PAINLEVEL_OUTOF10: 6

## 2024-06-13 ASSESSMENT — PAIN - FUNCTIONAL ASSESSMENT: PAIN_FUNCTIONAL_ASSESSMENT: 0-10

## 2024-06-13 NOTE — PROGRESS NOTES
Physical Therapy Treatment    Patient Name: Danial Salazar  MRN: [id number]  Today's Date: 6/13/2024  Time Calculation  Start Time: 0230  Stop Time: 0313  Time Calculation (min): 43 min  Billing:  Treatment:   Therapeutic Exercise:  23  NMR:  20    Insurance  Visit #:  12    Insurance Reviewed (per information provided by  pre-cert team)  Authorization required:  No, Medicare (Initial send 2/26/24; 2nd send 3/19/24; 3rd send 4/2/24)   Dates: 2/26/24 to 5/26/24; 5/27/24 to 7/27/24    Assessment:  PT Assessment  PT Assessment Results: Decreased strength, Impaired balance, Decreased mobility, Pain, Decreased safety awareness  Rehab Prognosis: Fair  Evaluation/Treatment Tolerance: Patient tolerated treatment well  pt tolerated treatment well. Has continued difficulty balance, gait, and completing exercises in therapy due to poor tolerance. pt needs continued skilled PT to work on strength and dynamic mobility/stability to allow return to optimal level of functional mobility and tolerance with activities.  pt left session with all questions answered.     Plan:  OP PT Plan  Treatment/Interventions: Cryotherapy, Education/ Instruction, Manual therapy, Neuromuscular re-education, Self care/ home management, Taping techniques, Therapeutic activities, Therapeutic exercises, Biofeedback, Gait training  PT Plan: Skilled PT  Onset Date: 02/19/24  NV: Start on shuttle board  compliant surfaces     Current Problem  Problem List Items Addressed This Visit             ICD-10-CM    Parkinson disease (Multi) G20.A1       General  PT  Visit  PT Received On: 06/13/24  Response to Previous Treatment: Patient with no complaints from previous session.  General  Reason for Referral: Parkinson's  Referred By: MD Esa  Past Medical History Relevant to Rehab: occassionally high blood pressure (according to pt), PD  Preferred Learning Style: visual, verbal, written  General Comment: Reports he has not had any falls since visit. No other news  "to report.    Subjective    Precautions  Precautions  [facility] Fall Risk Score (The score of 4 or more indicates an increased risk of falling): yes  Hearing/Visual Limitations: [place]  Medical Precautions: Fall precautions  Pain  Pain Assessment  Pain Assessment: 0-10  Pain Score: 6  Pain Type: Acute pain  Pain Location: Buttocks  Pain Orientation: Left  Pain Descriptors: Stabbing  Pain Frequency: Constant/continuous    Objective     Gait: The patient is ambulating with the following device: Ambulates with a cane. Gait deviations include: Lacks heel strike, Decreased velocity, Shuffling, Downward gaze, and Forward flexed.     Treatments: *increased time to complete tasks*  Therapeutic Exercise  Therapeutic Exercise Performed: Yes  1. Nustep for 8min with goal to steps per minute > 70 -90  2. Repeated STS throughout session from standard chair w/ CGA x5   3. Shuttle 50# BL LE Leg press 3x10 BL   4. Shuttle 25# SL leg press 2x10 BL     Balance/Neuromuscular Re-Education:  Max VC and Visual cues for patient to perform correctly; CGA to Min A with use of gait belt   1. Airex Marches 2x10 at // bar TC for proper knee height in marches   2. PWR STS on Airex with blaze pod touch 4 pods 1 colors 60\" on/off for 3 cycles w/ CGA   3. Blaze Pod Stepping next to // bar 3x30s     OP EDUCATION:  Outpatient Education  Individual(s) Educated: Patient  Education Provided: Anatomy, Body Mechanics, Fall Risk, Posture  Patient/Caregiver Demonstrated Understanding: yes  Patient Response to Education: Patient/Caregiver Performed Return Demonstration of Exercises/Activities, Patient/Caregiver Asked Appropriate Questions, Patient/Caregiver Verbalized Understanding of Information  "

## 2024-06-28 ENCOUNTER — APPOINTMENT (OUTPATIENT)
Dept: PHYSICAL THERAPY | Facility: CLINIC | Age: 74
End: 2024-06-28
Payer: MEDICARE

## 2024-07-05 ENCOUNTER — TREATMENT (OUTPATIENT)
Dept: PHYSICAL THERAPY | Facility: CLINIC | Age: 74
End: 2024-07-05
Payer: MEDICARE

## 2024-07-05 DIAGNOSIS — G20.A1 PARKINSON DISEASE (MULTI): ICD-10-CM

## 2024-07-05 PROCEDURE — 97112 NEUROMUSCULAR REEDUCATION: CPT | Mod: GP

## 2024-07-05 PROCEDURE — 97110 THERAPEUTIC EXERCISES: CPT | Mod: GP

## 2024-07-05 ASSESSMENT — PAIN DESCRIPTION - DESCRIPTORS: DESCRIPTORS: STABBING

## 2024-07-05 ASSESSMENT — PAIN SCALES - GENERAL: PAINLEVEL_OUTOF10: 7

## 2024-07-05 ASSESSMENT — PAIN - FUNCTIONAL ASSESSMENT: PAIN_FUNCTIONAL_ASSESSMENT: 0-10

## 2024-07-05 NOTE — PROGRESS NOTES
Physical Therapy Treatment    Patient Name: Danial Salazar  MRN: [id number]  Today's Date: 7/5/2024  Time Calculation  Start Time: 0238  Stop Time: 0319  Time Calculation (min): 41 min  Billing:  Treatment:   Therapeutic Exercise:  15  NMR:  26    Insurance  Visit #:  13    Insurance Reviewed (per information provided by  pre-cert team)  Authorization required:  No, Medicare (Initial send 2/26/24; 2nd send 3/19/24; 3rd send 4/2/24)   Dates: 2/26/24 to 5/26/24; 5/27/24 to 7/27/24    Assessment:  PT Assessment  PT Assessment Results: Decreased strength, Impaired balance, Decreased mobility  Rehab Prognosis: Fair  pt tolerated treatment well but required increased time to complete all tasks. Has not been to PT since 6/13/24 - unable to progress to date due to lack of strength and function needed to progress said exercises. pt needs continued skilled PT to work on dynamic balance, strength, and gait to maintain optimal level of functional mobility and tolerance with activities. With patient's inconsistent therapy appointments it is difficult to tell if the patient is progressing with goals. pt left session with all questions answered.     Plan:  OP PT Plan  Treatment/Interventions: Cryotherapy, Education/ Instruction, Manual therapy, Neuromuscular re-education, Self care/ home management, Taping techniques, Therapeutic activities, Therapeutic exercises, Biofeedback, Gait training  PT Plan: Skilled PT  Onset Date: 02/19/24  NV: Continue with higher steps paired with dynamic stepping with blaze pods     Current Problem  Problem List Items Addressed This Visit             ICD-10-CM    Parkinson disease (Multi) G20.A1       General  PT  Visit  PT Received On: 07/05/24  Response to Previous Treatment: Patient with no complaints from previous session.  General  Reason for Referral: Parkinson's  Referred By: MD Esa  Past Medical History Relevant to Rehab: occassionally high blood pressure (according to pt), PD  Preferred  "Learning Style: visual, verbal, written  General Comment: Per pt report he has been sickand has also fallen 2x since last seen - about 3 weeks ago. States he was on the ground watching TV and tried to get up and then fell back into the couch and the other fall he got too fast up out of bed and felt dizzy and fell into the dresser.    Subjective    Precautions  Precautions  STEADI Fall Risk Score (The score of 4 or more indicates an increased risk of falling): yes  Hearing/Visual Limitations: [place]  Medical Precautions: Fall precautions  Pain  Pain Assessment  Pain Assessment: 0-10  0-10 (Numeric) Pain Score: 7  Pain Type: Acute pain  Pain Location: Buttocks  Pain Orientation: Left  Pain Descriptors: Stabbing  Pain Frequency: Constant/continuous    Objective   Posture  Postural Control  Posture Comment: Fwd head, rounded shoulders,    Gait: The patient is ambulating with the following device: Ambulates with a cane. Gait deviations include: Lacks heel strike, Decreased velocity, Decreased stride length, Downward gaze, Forward flexed, and freezing gait pattern.     Treatments:  Therapeutic Exercise  1. Nustep for 8min with goal to steps per minute > 70 -90  2. Repeated STS throughout session from standard chair w/ CGA x5   3. HS/Calf Stretch at Stairs 2x30s      Balance/Neuromuscular Re-Education:  Max VC and Visual cues for patient to perform correctly; CGA to Min A with use of gait belt   1. Airex Marches 2x10 at // bar TC for proper knee height in marches   2. PWR STS on Airex with blaze pod touch 6 pods 1 colors 60\" on/off for 3 cycles w/ CGA   3. Blaze Pod Stepping next to // bar 3x30s 8in box and 4in box; 1 Ue assist with use of // bar   4. Shuttle 50# BL LE Leg press 3x10 BL with wobble board  5. Shuttle 37# SL leg press 2x10 BL     OP EDUCATION:  Outpatient Education  Individual(s) Educated: Patient  Education Provided: Anatomy, Body Mechanics, Posture  Patient/Caregiver Demonstrated Understanding: yes  Patient " Response to Education: Patient/Caregiver Performed Return Demonstration of Exercises/Activities, Patient/Caregiver Asked Appropriate Questions, Patient/Caregiver Verbalized Understanding of Information

## 2024-07-12 ENCOUNTER — TREATMENT (OUTPATIENT)
Dept: PHYSICAL THERAPY | Facility: CLINIC | Age: 74
End: 2024-07-12
Payer: MEDICARE

## 2024-07-12 DIAGNOSIS — G20.A1 PARKINSON DISEASE (MULTI): ICD-10-CM

## 2024-07-12 PROCEDURE — 97112 NEUROMUSCULAR REEDUCATION: CPT | Mod: GP

## 2024-07-12 PROCEDURE — 97110 THERAPEUTIC EXERCISES: CPT | Mod: GP

## 2024-07-12 ASSESSMENT — PAIN DESCRIPTION - DESCRIPTORS: DESCRIPTORS: STABBING

## 2024-07-12 ASSESSMENT — PAIN - FUNCTIONAL ASSESSMENT: PAIN_FUNCTIONAL_ASSESSMENT: 0-10

## 2024-07-12 ASSESSMENT — PAIN SCALES - GENERAL: PAINLEVEL_OUTOF10: 7

## 2024-07-12 NOTE — PROGRESS NOTES
Physical Therapy Treatment    Patient Name: Danial Salazar  MRN: [id number]  Today's Date: 7/12/2024  Time Calculation  Start Time: 0115  Stop Time: 0155  Time Calculation (min): 40 min  Billing:  Treatment:   Therapeutic Exercise:  15  NMR:  25    Insurance  Visit #:  14    Insurance Reviewed (per information provided by  pre-cert team)  Authorization required:  No, Medicare (Initial send 2/26/24; 2nd send 3/19/24; 3rd send 4/2/24)   Dates: 2/26/24 to 5/26/24; 5/27/24 to 7/27/24    Assessment:  PT Assessment  PT Assessment Results: Decreased strength, Impaired balance, Decreased mobility  Rehab Prognosis: Fair  pt tolerated treatment well, did well with STS with blaze pods - did not need to use UE to assist in stands or transfer to sitting. pt needs continued skilled PT to work on dynamic mobility and strength to maintain optimal level of functional mobility and tolerance with activities.  pt left session with all questions answered.     Plan:  OP PT Plan  Treatment/Interventions: Cryotherapy, Education/ Instruction, Manual therapy, Neuromuscular re-education, Self care/ home management, Taping techniques, Therapeutic activities, Therapeutic exercises, Biofeedback, Gait training  PT Plan: Skilled PT  Onset Date: 02/19/24  NV: 3 darío square dynamic balance     Current Problem  Problem List Items Addressed This Visit             ICD-10-CM    Parkinson disease (Multi) G20.A1     General  PT  Visit  PT Received On: 07/12/24  Response to Previous Treatment: Patient with no complaints from previous session.  General  Reason for Referral: Parkinson's  Referred By: MD Esa  Past Medical History Relevant to Rehab: occassionally high blood pressure (according to pt), PD  Preferred Learning Style: visual, verbal, written  General Comment: Pt reports a couple near falls. Reports transitions from sit to stand is when he notices decreased balance.    Subjective    Precautions  Precautions  STEADI Fall Risk Score (The score  "of 4 or more indicates an increased risk of falling): yes  Hearing/Visual Limitations: [place]  Medical Precautions: Fall precautions  Pain  Pain Assessment  Pain Assessment: 0-10  0-10 (Numeric) Pain Score: 7  Pain Type: Acute pain  Pain Location: Buttocks  Pain Orientation: Left  Pain Descriptors: Stabbing  Pain Frequency: Constant/continuous    Objective   Posture  Postural Control  Posture Comment: Fwd head, rounded shoulders,  Extremity/Trunk Assessment  Gait: The patient is ambulating with the following device: Ambulates with a cane. Gait deviations include: Decreased velocity, Decreased stride length, Shuffling, Downward gaze, and Forward flexed. NBOS    Treatments:  Therapeutic Exercise  1. Nustep for 8min with goal to steps per minute > 70 -90  2. Repeated STS throughout session from standard chair w/ CGA x5   3. HS/Calf Stretch at Stairs 2x30s      Balance/Neuromuscular Re-Education:  Mod VC and Visual cues for patient to perform correctly; CGA with use of gait belt   1. Airex Marches 3x10 at // bar TC for proper knee height in marches; no use of UE; fingertips hover over bar   2. PWR STS on Airex with blaze pod touch 6 pods 1 colors 60\" on/off for 3 cycles w/ CGA   3. Blaze Pod Stepping next to // bar 4x30s 8in box and 4in box; 1 Ue assist with use of // bar; Max taps each round; 30s standing breaks in btw sets  4. Shuttle 50# BL LE Leg press 2x10 BL with wobble board  5. Shuttle 37# SL leg press 2x10 BL    OP EDUCATION:  Outpatient Education  Individual(s) Educated: Patient  Education Provided: Anatomy, Body Mechanics, Posture  Patient/Caregiver Demonstrated Understanding: yes  Patient Response to Education: Patient/Caregiver Performed Return Demonstration of Exercises/Activities, Patient/Caregiver Asked Appropriate Questions, Patient/Caregiver Verbalized Understanding of Information  "

## 2024-07-16 ENCOUNTER — TREATMENT (OUTPATIENT)
Dept: PHYSICAL THERAPY | Facility: CLINIC | Age: 74
End: 2024-07-16
Payer: MEDICARE

## 2024-07-16 DIAGNOSIS — G20.A1 PARKINSON DISEASE (MULTI): ICD-10-CM

## 2024-07-16 PROCEDURE — 97110 THERAPEUTIC EXERCISES: CPT | Mod: GP

## 2024-07-16 PROCEDURE — 97112 NEUROMUSCULAR REEDUCATION: CPT | Mod: GP

## 2024-07-16 ASSESSMENT — PAIN SCALES - GENERAL: PAINLEVEL_OUTOF10: 8

## 2024-07-16 ASSESSMENT — PAIN - FUNCTIONAL ASSESSMENT: PAIN_FUNCTIONAL_ASSESSMENT: 0-10

## 2024-07-16 ASSESSMENT — PAIN DESCRIPTION - DESCRIPTORS: DESCRIPTORS: OTHER (COMMENT)

## 2024-07-16 NOTE — PROGRESS NOTES
Physical Therapy Treatment    Patient Name: Danial Salazar  MRN: [id number]  Today's Date: 7/16/2024  Time Calculation  Start Time: 0330  Stop Time: 0414  Time Calculation (min): 44 min  Billing:  Treatment:   Therapeutic Exercise:  15  NMR:  29    Insurance  Visit #:  15    Insurance Reviewed (per information provided by  pre-cert team)  Authorization required:  No, Medicare (Initial send 2/26/24; 2nd send 3/19/24; 3rd send 4/2/24)   Dates: 2/26/24 to 5/26/24; 5/27/24 to 7/27/24    Assessment:  PT Assessment  PT Assessment Results: Decreased strength, Impaired balance, Decreased mobility  Rehab Prognosis: Fair  pt tolerated treatment well, did well with strengthening exercises today. New blaze pod activity with hurdles was a good challenge for the patient and required increasing hip flexion for darío clearance. pt needs continued skilled PT to work on strength, gait, and balance to maintain optimal level of functional mobility and tolerance with activities. pt left session with all questions answered.     Plan:  OP PT Plan  Treatment/Interventions: Cryotherapy, Education/ Instruction, Manual therapy, Neuromuscular re-education, Self care/ home management, Taping techniques, Therapeutic activities, Therapeutic exercises, Biofeedback, Gait training  PT Plan: Skilled PT  Onset Date: 02/19/24  NV: Re-Check    Current Problem  Problem List Items Addressed This Visit             ICD-10-CM    Parkinson disease (Multi) G20.A1       General  PT  Visit  PT Received On: 07/16/24  Response to Previous Treatment: Patient with no complaints from previous session.  General  Reason for Referral: Parkinson's  Referred By: MD Esa  Past Medical History Relevant to Rehab: occassionally high blood pressure (according to pt), PD  Preferred Learning Style: visual, verbal, written  General Comment: Reports he has been loosing his balance more; notices it specifically with Sit to stand transfers    Subjective   "  Precautions  Precautions  STEADI Fall Risk Score (The score of 4 or more indicates an increased risk of falling): yes  Hearing/Visual Limitations: [place]  Medical Precautions: Fall precautions  Pain  Pain Assessment  Pain Assessment: 0-10  0-10 (Numeric) Pain Score: 8  Pain Type: Acute pain  Pain Location:  (Upper thigh and buttocks)  Pain Orientation: Left  Pain Descriptors: Other (Comment) (\"generalized pain\")  Pain Frequency: Constant/continuous    Objective   Posture  Postural Control  Posture Comment: Fwd head, rounded shoulders,  Extremity/Trunk Assessment  Gait: The patient is ambulating with the following device: Ambulates with a cane. Gait deviations include: Lacks heel strike, Decreased velocity, Decreased stride length, Shuffling, Downward gaze, Forward flexed, and at time festinating/freezing gait pattern.     Treatments:  Therapeutic Exercise  1. Nustep for 8min with goal to steps per minute > 70 -90  2. Repeated STS throughout session from standard chair w/ CGA x5   3. HS/Calf Stretch at Stairs 2x30s      Balance/Neuromuscular Re-Education:  Mod VC and Visual cues for patient to perform correctly; CGA with use of gait belt   1. Airex Marches 3x10 at // bar TC for proper knee height in ; no use of UE; fingertips hover over bar (Np)   2. PWR STS on Airex with blaze pod touch 6 pods 1 colors 60\" on/off for 2 cycles w/ CGA   3. Blaze Pod 3 hurdles square steppin pods 4x30s; with 30s off; CGA to Min A with gait blet and use of // bar for suporrt  4. Shuttle 50# BL LE Leg press 3x10 BL with wobble board  5. Shuttle 37# SL leg press 2x10 BL with wobble board     OP EDUCATION:  Outpatient Education  Individual(s) Educated: Patient  Education Provided: Anatomy, Body Mechanics, Posture  Patient/Caregiver Demonstrated Understanding: yes  Patient Response to Education: Patient/Caregiver Performed Return Demonstration of Exercises/Activities, Patient/Caregiver Asked Appropriate Questions, " Patient/Caregiver Verbalized Understanding of Information

## 2024-07-26 ENCOUNTER — TREATMENT (OUTPATIENT)
Dept: PHYSICAL THERAPY | Facility: CLINIC | Age: 74
End: 2024-07-26
Payer: MEDICARE

## 2024-07-26 DIAGNOSIS — G20.A1 PARKINSON DISEASE (MULTI): ICD-10-CM

## 2024-07-26 PROCEDURE — 97530 THERAPEUTIC ACTIVITIES: CPT | Mod: GP

## 2024-07-26 PROCEDURE — 97110 THERAPEUTIC EXERCISES: CPT | Mod: GP

## 2024-07-26 ASSESSMENT — PAIN SCALES - GENERAL: PAINLEVEL_OUTOF10: 7

## 2024-07-26 ASSESSMENT — PAIN DESCRIPTION - DESCRIPTORS: DESCRIPTORS: NUMBNESS

## 2024-07-26 ASSESSMENT — PAIN - FUNCTIONAL ASSESSMENT: PAIN_FUNCTIONAL_ASSESSMENT: 0-10

## 2024-07-26 NOTE — PROGRESS NOTES
Physical Therapy Re-Assessment/DC    Patient Name: Danial Salazar  MRN: [id number]  Today's Date: 7/26/2024  Time Calculation  Start Time: 0113  Stop Time: 0155  Time Calculation (min): 42 min  Billing:  Treatment:   Therapeutic Exercise:  8  Therapeutic Activity:  34    Insurance  Visit #:  16    Insurance Reviewed (per information provided by  pre-cert team)  Authorization required:  No, Medicare (Initial send 2/26/24; 2nd send 3/19/24; 3rd send 4/2/24)   Dates: 2/26/24 to 5/26/24; 5/27/24 to 7/27/24    Assessment:  PT Assessment  PT Assessment Results: Decreased strength, Impaired balance, Decreased mobility, Pain  Rehab Prognosis: Fair  Pt presents to physical therapy as a re-evaluation. At this time pt continues to demonstrate deficits in gait, balance, strength, functional mobility and tolerance with activity. He has had major improvements in timed tug/tug cog Mini best and 5xsts testing that was completed today. At this time I recommended him continuing with therapy due gains and areas for improvement still. Pt is requesting to pause and be discharged from therapy at this time.     Plan:  OP PT Plan  PT Plan: No Additional PT interventions required at this time  Onset Date: 02/19/24    Current Problem  Problem List Items Addressed This Visit             ICD-10-CM    Parkinson disease (Multi) G20.A1       General  PT  Visit  PT Received On: 07/26/24  Response to Previous Treatment: Patient with no complaints from previous session.  General  Reason for Referral: Parkinson's  Referred By: MD Esa  Past Medical History Relevant to Rehab: occassionally high blood pressure (according to pt), PD  Preferred Learning Style: visual, verbal, written  General Comment: Reports he thinks PT has been going okay but is unsure if it is helping at all. Reports no falls, just a little instability. Does not use a cane in the house    Subjective    Precautions  Precautions  STEADI Fall Risk Score (The score of 4 or more  indicates an increased risk of falling): yes  Hearing/Visual Limitations: [place]  Medical Precautions: Fall precautions  Pain  Pain Assessment  Pain Assessment: 0-10  0-10 (Numeric) Pain Score: 7  Pain Location: Buttocks  Pain Orientation: Left  Pain Descriptors: Numbness    Objective   Functional Assessments:  Balance Comment: See MiniBest  Stairs Comment: Ascends/Descends 4 6in steps in a reciprocal pattern with BL use of HR; decreased foot clearance to clear steps. Close supervision   Bed Mobility Comment: Sup/Sit CGA; Sit to sup: Min A - LE assist   Transfers Comment: IND sit/stand and stand/sit     Extremity/Trunk Assessments:  RLE   RLE : Exceptions to WFL  AROM RLE (degrees)  RLE AROM Comment: WNL  PROM RLE (degrees)  RLE PROM Comment: WNL   - Pain on the L hip with mobility in flex/ER and ABD   Strength RLE  R Hip Flexion: 4+/5 --same --> same   R Hip ABduction: 4/5 --> same -->5/5   R Hip ADduction: 4/5 --> same --> 5/5 --> 4+/5  R Knee Flexion: 5/5  R Knee Extension: 4+/5 --> 5/5   R Ankle Dorsiflexion: 4/5 --> 5/5  R Ankle Plantar Flexion: 4/5 --> 5/5     LLE   LLE : Exceptions to WFL  AROM LLE (degrees)  LLE AROM Comment: WNL  PROM LLE (degrees)  LLE PROM Comment: WNL   Strength LLE  L Hip Flexion: 4/5 --> 4+/5 --> same   L Hip ABduction: 4/5 --> same -->5/5  L Hip ADduction: 4/5 --> same --> 5/5 --> 4+/5  L Knee Flexion: 4/5 --> 4+/5 -->5/5   L Knee Extension: 5/5  L Ankle Dorsiflexion: 5/5  L Ankle Plantar Flexion: 5/5     Gait: ambulates for 75+ft with SPC in clinic with shuffling gait, decreased knee extension throughout gait cycle; cane in the R extremity, BL knee flexion through entire gait cycle; decreased push off and swing phase; festination      HS 90/90 (+) Tight     4 Stage: NBOS: 10s, R/L Semi Tandem: 10s, L Tandem: 9s, R Tandem: 5s, SLS L: 20s;  SLS R: 20s     Outcome Measures:  Other Measures  5x Sit to Stand: 11.65s with no UE assist; standard chair  Activities - Specific Balance Confidence  Scale: 29  TU.65s   Tug Co.98s with SPC, no mistakes  Mini Best:      Treatments:  Therapeutic Exercise  Therapeutic Exercise Performed: Yes  Therapeutic Exercise Activity 1: Nustep for 8min with goal to steps per minute > 70 -90 to increase neuroplasticity       Therapeutic Activity- Re-Assessment  1. Repeated sit to/from stands from standardized chair height. Required VC for no use of hands, nose over toes, full upright stand from chair, use of anterior shift with fwd momentum and eccentric control into chair.  2. Functional Performance via gait analysis of TUG and Cog Tug: Verbal cues for sequencing/positioning. 2x10' at CGA with gait belt.   3. Functional mobility via AROM of LE; Verbal cues for sequencing/positioning.  4. Functional Performance via MMT of LE: Hip, knee, ankle; Verbal cues for sequencing/positioning.  5. Educated pt on POC, goals of physical therapy, purpose of physical therapy, as well as the benefits in participating in physical therapy to increase functional mobility and maximize pt safety.    6. Functional Performance via MiniBest - completed at CGA to Min A, gait belt; VC for proper sequencing/positioning. Decreased dynamic balance and reactive postural control.     OP EDUCATION:  Outpatient Education  Individual(s) Educated: Patient  Education Provided: Anatomy, Body Mechanics, Posture, Fall Risk, POC  Risk and Benefits Discussed with Patient/Caregiver/Other: yes  Patient/Caregiver Demonstrated Understanding: yes  Plan of Care Discussed and Agreed Upon: yes  Patient Response to Education: Patient/Caregiver Performed Return Demonstration of Exercises/Activities, Patient/Caregiver Asked Appropriate Questions, Patient/Caregiver Verbalized Understanding of Information    Goals: Re-Check (24, 24, DC 24)   STG: pt will be independent in HEP & symptom management     LTGs  Strength: Pt will improve B LE Strength to >/= 4+/5 to increase functional performance, tolerance  to activity, and enhancing pt safety to particpate in ADLs and IADLs. (Improving)      Gait: pt will demonstrate normal mechanics without pain during gait and performance of stairs, allowing for pt to return to navigating the community.  (Not met)      ABC: Patient will improve initial score > 70%; (Parkinson's Disease: Cut-off score of < 69% is predictive of recurrent falls; Stroke (chronic, > 6 months post); Cut-off score of 81.1% indicates relative certainty that the patient does not have a  history of falls.) Baseline: 45%  --> 29%      MiniBest: patient will show SEBASTIÁN of 5.5 points or >16 to decrease fall risk, improve dynamic gait, and balance. Baseline: 13/28 --> 18/28 --> 14/28 --> 19/28      TUG: pt will complete TUG within 12 seconds or less (indicative of higher fall risk) in order to INC balance and enhance safety during ADLs/ IADLs. (> or equal to 12 seconds indicates high risk for falls in older adults. 11-20 seconds is normal for the frail and elderly.) OR MCID 3.4s Baseline 13.69 sec --> 10.21s --> 13.76 --> 9.65s     TUG Cog: pt will complete the TUG Cog with a difference of less than 4.5s (Difference between TUG manual and Tug is > 4.5 sec, this indicates an increased risk of falls Healthy adults):  Baseline: 28.5s --> 15.93 (unable to name more than 2 subtractions) --> 19.1s  --> 12.98s     5xSTS: pt will perform 5x sit to  < 15 seconds to decrease fall risk. OR MCID 2.3s Baseline 19.89  sec --> 15.42s --> 18.43s --> 11.65s     Stairs: pt will ascend/descend step over step (6in step) with proper gait mechanics and use of <1HR to promote functional mobility and improve functional performance. (Slow dora)

## 2024-09-10 ENCOUNTER — APPOINTMENT (OUTPATIENT)
Dept: NEUROLOGY | Facility: CLINIC | Age: 74
End: 2024-09-10
Payer: COMMERCIAL

## 2024-09-10 VITALS
BODY MASS INDEX: 26.48 KG/M2 | HEIGHT: 69 IN | SYSTOLIC BLOOD PRESSURE: 130 MMHG | DIASTOLIC BLOOD PRESSURE: 60 MMHG | WEIGHT: 178.8 LBS | HEART RATE: 57 BPM

## 2024-09-10 DIAGNOSIS — G20.A1 PARKINSON'S DISEASE WITHOUT DYSKINESIA, UNSPECIFIED WHETHER MANIFESTATIONS FLUCTUATE (MULTI): Primary | ICD-10-CM

## 2024-09-10 DIAGNOSIS — G20.C PARKINSONISM, UNSPECIFIED PARKINSONISM TYPE (MULTI): ICD-10-CM

## 2024-09-10 PROCEDURE — 99214 OFFICE O/P EST MOD 30 MIN: CPT | Performed by: PSYCHIATRY & NEUROLOGY

## 2024-09-10 PROCEDURE — 1160F RVW MEDS BY RX/DR IN RCRD: CPT | Performed by: PSYCHIATRY & NEUROLOGY

## 2024-09-10 PROCEDURE — G2211 COMPLEX E/M VISIT ADD ON: HCPCS | Performed by: PSYCHIATRY & NEUROLOGY

## 2024-09-10 PROCEDURE — 3008F BODY MASS INDEX DOCD: CPT | Performed by: PSYCHIATRY & NEUROLOGY

## 2024-09-10 PROCEDURE — 1036F TOBACCO NON-USER: CPT | Performed by: PSYCHIATRY & NEUROLOGY

## 2024-09-10 PROCEDURE — 1159F MED LIST DOCD IN RCRD: CPT | Performed by: PSYCHIATRY & NEUROLOGY

## 2024-09-10 RX ORDER — CARBIDOPA AND LEVODOPA 25; 100 MG/1; MG/1
TABLET ORAL
Qty: 405 TABLET | Refills: 3 | Status: SHIPPED | OUTPATIENT
Start: 2024-09-10

## 2024-09-10 NOTE — PATIENT INSTRUCTIONS
"It was a pleasure seeing you today.     See if psychiatry will be able to wean off abilify. ( May be worsening parkinsonism)    Raise Sinemet 1.5 tabs three times daily. ( Roughly 5 hours apart while awake)    Please make a follow up appointment in 5-6 months.  You may also schedule a phone or virtual visit sooner on a Friday morning with me as needed before the next clinic appointment.     For any urgent issues or needing to speak to a medical assistant please call [phone], option 6 during our office hours Monday-Friday 8am-4pm, and leave a voicemail with your concern.  My office will try to reach back you as soon as possible within 24 (business) hours.  If you have an emergency please call 911 or visit a local urgent care or nearest emergency room.      Please understand that [employer] is a useful communication tool for simple \"normal\" results or a refill request but I would not recommend using this tool for emergent or urgent issues or for conversations with me.  I am happy to ask my staff to rearrange a follow up visit or a virtual visit sooner than requested if appropriate for your care.    "

## 2024-09-10 NOTE — PROGRESS NOTES
Subjective     Danial Salazar is a 74 y.o. year old RH male here for Parkinson disease follow up.    HPI  Patient is accompanied by his daughter.  Since last visit he tried sinemet 25-100mg  up to 1 tab TID. Takes every 8 hours in the day.  No worsening symptoms.   He takes multiple naps.   Short term memory impaired.  Forgets conversations easily.   Right hand tremor is still present.  No changes.   MRI brain October 2023 which was normal.  DaTscan  September 2023 which was normal.   records from neurologist in the past was reviewed, dated November 2023. His gait seems to be worsening he is falling more.  His right hand shaking seems worse.  He started propranolol which does not seem to help his tremor.  He is on Abilify 5 mg daily and Depakote.  He has a history of bipolar disorder, lumbar disease and chronic low back pain memory has been poor.    Right hand tremor began 5 years ago once in a while, seems worse once on Abilify.   They think he has been on abilify for many years.   Psychiatry NP and not comfortable changing his medications.  No constipation. No dizziness.  When drinking his tremors is annoying.    He was in [facility].   He has had epidural injections in the lower back with pain mgmt, Dr. Beckett.   FH: mother may have had memory issues. He does not know his brother's health.  He has not seen a Primary care doctor in decades.   He had epidural injections for his back.   He is taking depakote , prazosin, abilify, probenecid, propranolol, ativan, hydroxyxine.   Review of Systems    Patient Active Problem List   Diagnosis    Parkinson disease (Multi)     No past medical history on file.  No past surgical history on file.  Social History     Tobacco Use    Smoking status: Never    Smokeless tobacco: Never   Substance Use Topics    Alcohol use: Never     family history is not on file.    Current Outpatient Medications:     carbidopa-levodopa (Sinemet)  mg tablet, Take 1/2 tab TID with small  meals x 7 days then take 1 tab TID and continue, Disp: 270 tablet, Rfl: 3  No Known Allergies  There were no vitals taken for this visit.  Neurological Exam/Physical Exam:    Constitutional: General appearance: no acute distress. Pleasant.  In wheelchair. Hypomimia.   Auscultation of Heart: Regular rate and rhythm, no murmurs, normal S1 and S2.   Carotid Arteries: no bruits  Peripheral Vascular Exam: No swelling, edema or tenderness to palpation in extremities  Mental status: no distress, alert, interactive and cooperative. Affect is appropriate.   Orientation: oriented to person, oriented to place and oriented to time.   Memory: recent memory impaired.  Attention: normal attention   Language: normal comprehension  Hypophonia. Dysarthria.   Eyes: The ophthalmoscopic examination was normal.   Cranial nerve II: Visual fields full to confrontation.   Cranial nerves III, IV, and VI: Pupils round, equally reactive to light; EOMs slow in all directions of gaze.   Cranial Nerve V: Facial sensation intact to LT bilaterally.   Cranial nerve VII: no facial droop  Cranial nerve VIII: Hearing is intact  Cranial nerves IX and X: Palate elevates symmetrically.   Cranial nerve XI: Shoulder shrug intact.   Cranial nerve XII: Tongue is midline.  Motor:  Muscle bulk was normal in both upper and lower extremities.    No atrophy.   Strength is normal. Mild resting tremor of RUE, RUE and RLE.  Has moderate bradykinesia on RUE and RLE.   Deep Tendon Reflexes: left biceps 2+ , right biceps 2+, left triceps 2+, right triceps 2+, left brachioradialis 2+, right brachioradialis 2+, left patella 2+, right patella 2+, left ankle jerk 1+, right ankle jerk 1+   Plantar Reflex: Toes downgoing to plantar stimulation on the left. Toes downgoing to plantar stimulation on the right.   Sensory Exam: Normal to vibratory sensation  Coordination:  no limb dystaxia   Gait:  both knees are buckled and shuffling, leans toward the right.         Labs:  CBC:    Lab Results   Component Value Date    WBC 5.0 12/14/2021    HGB 17.0 12/14/2021    HCT 48.4 12/14/2021     12/14/2021     BMP:   Lab Results   Component Value Date     09/13/2022    K 4.4 09/13/2022     09/13/2022    CO2 28 09/13/2022    BUN 17 09/13/2022    CREATININE 1.22 09/13/2022    CALCIUM 9.4 09/13/2022     LFT:   Lab Results   Component Value Date    ALKPHOS 69 12/14/2021    BILITOT 0.9 12/14/2021    BILIDIR 0.1 12/14/2021    PROT 6.3 (L) 12/14/2021    ALBUMIN 4.1 12/14/2021    ALT 14 12/14/2021    AST 16 12/14/2021         Assessment/Plan   Problem List Items Addressed This Visit    None    This is a chronic neurologic condition that requires ongoing care and monitoring. This is a complex, serious condition that needs long term care going forward. Between myself and the patient we will be changing direction of care depending on responses to treatment.   Today we discussed medication options, non medication options for management and various other symptoms that are in relation to this disease.  I will continue to be involved in the care of this patient.      May be lumbar disease contributing and Abilify is contributing to his symptoms as well.   Likely also has Parkinson's disease given the progression.   Trial sinemet up to 1 tab TID.   DaTscan and MRI brain are normal.  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  PT .  See PCP for preventative care.

## 2025-02-19 ENCOUNTER — TELEPHONE (OUTPATIENT)
Dept: NEUROLOGY | Facility: CLINIC | Age: 75
End: 2025-02-19
Payer: COMMERCIAL

## 2025-02-19 NOTE — TELEPHONE ENCOUNTER
Patients daughter called in stating that her dad has been to the ED several times as his memory is declining to the point he is unsafe. They are afraid he will hurt her mom or anyone else. She has tried to have him admitted however they seem to not be able to. I put him on for a virtual visit this Friday however she is unsure what to do until then.

## 2025-02-21 ENCOUNTER — TELEMEDICINE (OUTPATIENT)
Dept: NEUROLOGY | Facility: CLINIC | Age: 75
End: 2025-02-21
Payer: MEDICARE

## 2025-02-21 DIAGNOSIS — G20.C PARKINSONISM, UNSPECIFIED PARKINSONISM TYPE (MULTI): Primary | ICD-10-CM

## 2025-02-21 DIAGNOSIS — M54.16 LUMBAR RADICULOPATHY: ICD-10-CM

## 2025-02-21 PROCEDURE — 1160F RVW MEDS BY RX/DR IN RCRD: CPT | Performed by: PSYCHIATRY & NEUROLOGY

## 2025-02-21 PROCEDURE — 1036F TOBACCO NON-USER: CPT | Performed by: PSYCHIATRY & NEUROLOGY

## 2025-02-21 PROCEDURE — 1159F MED LIST DOCD IN RCRD: CPT | Performed by: PSYCHIATRY & NEUROLOGY

## 2025-02-21 PROCEDURE — G2211 COMPLEX E/M VISIT ADD ON: HCPCS | Performed by: PSYCHIATRY & NEUROLOGY

## 2025-02-21 PROCEDURE — 99213 OFFICE O/P EST LOW 20 MIN: CPT | Performed by: PSYCHIATRY & NEUROLOGY

## 2025-02-21 RX ORDER — LORAZEPAM 0.5 MG/1
0.5 TABLET ORAL EVERY 6 HOURS PRN
COMMUNITY

## 2025-02-21 RX ORDER — GABAPENTIN 300 MG/1
CAPSULE ORAL
Qty: 60 CAPSULE | Refills: 3 | Status: SHIPPED | OUTPATIENT
Start: 2025-02-21

## 2025-02-21 RX ORDER — CARBIDOPA AND LEVODOPA 25; 100 MG/1; MG/1
TABLET ORAL
Qty: 405 TABLET | Refills: 3 | Status: SHIPPED | OUTPATIENT
Start: 2025-02-21

## 2025-02-21 RX ORDER — OXYCODONE AND ACETAMINOPHEN 5; 325 MG/1; MG/1
1 TABLET ORAL EVERY 4 HOURS PRN
COMMUNITY
Start: 2025-02-19 | End: 2025-02-22

## 2025-02-21 RX ORDER — PRAZOSIN HYDROCHLORIDE 2 MG/1
2 CAPSULE ORAL NIGHTLY
COMMUNITY

## 2025-02-21 RX ORDER — PROBENECID 500 MG/1
500 TABLET, FILM COATED ORAL 2 TIMES DAILY
COMMUNITY

## 2025-02-21 RX ORDER — TIZANIDINE 4 MG/1
1 TABLET ORAL 3 TIMES DAILY PRN
COMMUNITY
Start: 2025-02-19

## 2025-02-21 RX ORDER — METHYLPREDNISOLONE 4 MG/1
4 TABLET ORAL
COMMUNITY
Start: 2025-02-13

## 2025-02-21 RX ORDER — PROPRANOLOL HYDROCHLORIDE 40 MG/1
40 TABLET ORAL 2 TIMES DAILY
COMMUNITY

## 2025-02-21 RX ORDER — DIVALPROEX SODIUM 500 MG/1
500 TABLET, DELAYED RELEASE ORAL 2 TIMES DAILY
COMMUNITY

## 2025-02-21 ASSESSMENT — PATIENT HEALTH QUESTIONNAIRE - PHQ9
1. LITTLE INTEREST OR PLEASURE IN DOING THINGS: SEVERAL DAYS
SUM OF ALL RESPONSES TO PHQ9 QUESTIONS 1 AND 2: 2
2. FEELING DOWN, DEPRESSED OR HOPELESS: SEVERAL DAYS
10. IF YOU CHECKED OFF ANY PROBLEMS, HOW DIFFICULT HAVE THESE PROBLEMS MADE IT FOR YOU TO DO YOUR WORK, TAKE CARE OF THINGS AT HOME, OR GET ALONG WITH OTHER PEOPLE: VERY DIFFICULT

## 2025-02-21 ASSESSMENT — ENCOUNTER SYMPTOMS
OCCASIONAL FEELINGS OF UNSTEADINESS: 1
LOSS OF SENSATION IN FEET: 0
DEPRESSION: 0

## 2025-02-21 NOTE — PATIENT INSTRUCTIONS
"It was a pleasure seeing you today.     Start gabapentin as written for low back pain.     Please keep March 11 appt.     For any urgent issues or needing to speak to a medical assistant please call [phone], option 6 during our office hours Monday-Friday 8am-4pm, and leave a voicemail with your concern.  My office will try to reach back you as soon as possible within 24 (business) hours.  If you have an emergency please call 911 or visit a local urgent care or nearest emergency room.      Please understand that [employer] is a useful communication tool for simple \"normal\" results or a refill request but I would not recommend using this tool for emergent or urgent issues or for conversations with me.  I am happy to ask my staff to rearrange a follow up visit or a virtual visit sooner than requested if appropriate for your care.    "

## 2025-02-21 NOTE — PROGRESS NOTES
Subjective   Virtual or Telephone Consent-memory loss follow-up    An interactive audio and video telecommunication system which permits real time communications between the patient (at the originating site) and provider (at the distant site) was utilized to provide this telehealth service.   Verbal consent was requested and obtained from Danial Salazar on this date, 02/21/25 for a telehealth visit.    Danial Salazar is a 74 y.o. year old male here for tremor follow up.     Arrives with daughter.   New back pain.   HPI  Patient was last seen in September.  He was in the ER twice for back pain. Was given IV dilauded, steroid and toradol.   Had CT spine.  He had epidural injections in the past.   He sees an orthopedist on Monday.   Constant pain.  He is also constipated.  He stopped Sinemet 1 tab TID a couple weeks ago and did not feel good.  His daughter thinks the Sinemet was somewhat helpful.  He just restarted it Sinemet 25-100mg 1 tab TID 2 days ago.   Left leg feels weak.   Sleep is disrupted.  He is off Abilify for the past year.   Never tried gabapentin or topamax.   Is doing a medrol dose pack, tizanidine and percocet.   Right hand tremor is worse.   Memory is not good.  Sundowning more often at night.  Has pain at night which is annoying and problematic.  Patient had both MRI brain and a DaTscan in 2023 that was normal.  Back in November 2023 when he saw his neurologist he was taking Abilify 5 mg daily and he was noticing his walking was much worse.  Patient has issues with his back and had epidural injections.  He has been taking propranolol for his tremor.  Review of Systems    Patient Active Problem List   Diagnosis    Parkinson disease (Multi)     No past medical history on file.  No past surgical history on file.  Social History     Tobacco Use    Smoking status: Never    Smokeless tobacco: Never   Substance Use Topics    Alcohol use: Never     family history is not on file.    Current Outpatient  Medications:     carbidopa-levodopa (Sinemet)  mg tablet, Take 1.5 tabs TID, Disp: 405 tablet, Rfl: 3  Allergies   Allergen Reactions    Bactrim [Sulfamethoxazole-Trimethoprim] Rash    Penicillins Rash       Neurological Exam/Physical Exam:  alert, oriented. face symmetric. speech is clear, fluent.  He has hypomimia.  Appropriate, conversational.   moves all extremities above gravity.   Patient has a resting tremor of the right side with moderate bradykinesia of the right side.  Uses a cane on right hand.  Left leg antalgic.       Labs:  CBC:   Lab Results   Component Value Date    WBC 5.0 12/14/2021    HGB 17.0 12/14/2021    HCT 48.4 12/14/2021     12/14/2021     BMP:   Lab Results   Component Value Date     09/13/2022    K 4.4 09/13/2022     09/13/2022    CO2 28 09/13/2022    BUN 17 09/13/2022    CREATININE 1.22 09/13/2022    CALCIUM 9.4 09/13/2022     LFT:   Lab Results   Component Value Date    ALKPHOS 69 12/14/2021    BILITOT 0.9 12/14/2021    BILIDIR 0.1 12/14/2021    PROT 6.3 (L) 12/14/2021    ALBUMIN 4.1 12/14/2021    ALT 14 12/14/2021    AST 16 12/14/2021         Assessment/Plan   Problem List Items Addressed This Visit    None  Visit Diagnoses         Codes    Parkinsonism, unspecified Parkinsonism type (Multi)    -  Primary G20.C          Low back pain, chronic.   Memory issues - we may add rivastigmine in the future.   Will try gabapentin at night .  Restarted Sinemet 25-100mg 1 tab TID.  Consider PT.  Follow up with spine doctor/pain mgmt.   Total time with patient encounter: 24 minutes

## 2025-02-24 DIAGNOSIS — M54.16 LUMBAR RADICULOPATHY: Primary | ICD-10-CM

## 2025-02-27 ENCOUNTER — HOSPITAL ENCOUNTER (OUTPATIENT)
Dept: PAIN MEDICINE | Facility: CLINIC | Age: 75
Discharge: HOME | End: 2025-02-27
Payer: MEDICARE

## 2025-02-27 VITALS
HEART RATE: 54 BPM | DIASTOLIC BLOOD PRESSURE: 78 MMHG | OXYGEN SATURATION: 100 % | SYSTOLIC BLOOD PRESSURE: 148 MMHG | TEMPERATURE: 97.3 F | HEIGHT: 69 IN | BODY MASS INDEX: 25.18 KG/M2 | RESPIRATION RATE: 18 BRPM | WEIGHT: 170 LBS

## 2025-02-27 DIAGNOSIS — M54.16 LUMBAR RADICULOPATHY: ICD-10-CM

## 2025-02-27 PROCEDURE — 2500000004 HC RX 250 GENERAL PHARMACY W/ HCPCS (ALT 636 FOR OP/ED): Performed by: INTERNAL MEDICINE

## 2025-02-27 PROCEDURE — 64483 NJX AA&/STRD TFRM EPI L/S 1: CPT | Mod: LT | Performed by: INTERNAL MEDICINE

## 2025-02-27 RX ORDER — METHYLPREDNISOLONE ACETATE 40 MG/ML
INJECTION, SUSPENSION INTRA-ARTICULAR; INTRALESIONAL; INTRAMUSCULAR; SOFT TISSUE AS NEEDED
Status: COMPLETED | OUTPATIENT
Start: 2025-02-27 | End: 2025-02-27

## 2025-02-27 RX ORDER — LIDOCAINE HYDROCHLORIDE 10 MG/ML
INJECTION, SOLUTION EPIDURAL; INFILTRATION; INTRACAUDAL; PERINEURAL AS NEEDED
Status: COMPLETED | OUTPATIENT
Start: 2025-02-27 | End: 2025-02-27

## 2025-02-27 RX ADMIN — METHYLPREDNISOLONE ACETATE 40 MG: 40 INJECTION, SUSPENSION INTRA-ARTICULAR; INTRALESIONAL; INTRAMUSCULAR; INTRASYNOVIAL; SOFT TISSUE at 10:59

## 2025-02-27 RX ADMIN — LIDOCAINE HYDROCHLORIDE 10 ML: 10 INJECTION, SOLUTION EPIDURAL; INFILTRATION; INTRACAUDAL; PERINEURAL at 10:58

## 2025-02-27 SDOH — ECONOMIC STABILITY: FOOD INSECURITY: WITHIN THE PAST 12 MONTHS, THE FOOD YOU BOUGHT JUST DIDN'T LAST AND YOU DIDN'T HAVE MONEY TO GET MORE.: NEVER TRUE

## 2025-02-27 SDOH — ECONOMIC STABILITY: FOOD INSECURITY: WITHIN THE PAST 12 MONTHS, YOU WORRIED THAT YOUR FOOD WOULD RUN OUT BEFORE YOU GOT MONEY TO BUY MORE.: NEVER TRUE

## 2025-02-27 ASSESSMENT — PATIENT HEALTH QUESTIONNAIRE - PHQ9
SUM OF ALL RESPONSES TO PHQ9 QUESTIONS 1 AND 2: 0
1. LITTLE INTEREST OR PLEASURE IN DOING THINGS: NOT AT ALL
2. FEELING DOWN, DEPRESSED OR HOPELESS: NOT AT ALL

## 2025-02-27 ASSESSMENT — COLUMBIA-SUICIDE SEVERITY RATING SCALE - C-SSRS
1. IN THE PAST MONTH, HAVE YOU WISHED YOU WERE DEAD OR WISHED YOU COULD GO TO SLEEP AND NOT WAKE UP?: NO
6. HAVE YOU EVER DONE ANYTHING, STARTED TO DO ANYTHING, OR PREPARED TO DO ANYTHING TO END YOUR LIFE?: NO
2. HAVE YOU ACTUALLY HAD ANY THOUGHTS OF KILLING YOURSELF?: NO

## 2025-02-27 ASSESSMENT — LIFESTYLE VARIABLES
AUDIT-C TOTAL SCORE: 0
HOW MANY STANDARD DRINKS CONTAINING ALCOHOL DO YOU HAVE ON A TYPICAL DAY: PATIENT DOES NOT DRINK
HOW OFTEN DO YOU HAVE SIX OR MORE DRINKS ON ONE OCCASION: NEVER
SKIP TO QUESTIONS 9-10: 1
HOW OFTEN DO YOU HAVE A DRINK CONTAINING ALCOHOL: NEVER

## 2025-02-27 ASSESSMENT — PAIN SCALES - GENERAL
PAINLEVEL_OUTOF10: 10 - WORST POSSIBLE PAIN
PAINLEVEL_OUTOF10: 10 - WORST POSSIBLE PAIN

## 2025-02-27 ASSESSMENT — PAIN - FUNCTIONAL ASSESSMENT: PAIN_FUNCTIONAL_ASSESSMENT: 0-10

## 2025-02-27 ASSESSMENT — PAIN DESCRIPTION - DESCRIPTORS: DESCRIPTORS: SHARP

## 2025-02-27 NOTE — H&P
History Of Present Illness  Danial Salazar is a 74 y.o. male presenting withLeft leg pain .     Past Medical History  No past medical history on file.    Surgical History  No past surgical history on file.     Social History  He reports that he quit smoking about 30 years ago. His smoking use included cigarettes. He has never used smokeless tobacco. He reports that he does not drink alcohol and does not use drugs.    Family History  No family history on file.     Allergies  Bactrim [sulfamethoxazole-trimethoprim] and Penicillins    Review of Systems   Denies bowel bladder retention/incontinence  Physical Exam  Positive straight leg raise and contralateral straight leg raise  Last Recorded Vitals  There were no vitals taken for this visit.    Relevant Results        Demonstrates lumbar radiculopathy with disc herniation  Assessment/Plan   Assessment & Plan  Lumbar radiculopathy      Proceed with left L3-4 transforaminal epidural steroid  injection       I spent 10 minutes in the professional and overall care of this patient.      Panda Blackman, DO

## 2025-03-07 ENCOUNTER — APPOINTMENT (OUTPATIENT)
Dept: PAIN MEDICINE | Facility: CLINIC | Age: 75
End: 2025-03-07
Payer: COMMERCIAL

## 2025-03-11 ENCOUNTER — APPOINTMENT (OUTPATIENT)
Dept: NEUROLOGY | Facility: CLINIC | Age: 75
End: 2025-03-11
Payer: MEDICARE

## 2025-03-19 PROBLEM — M54.16 LEFT LUMBAR RADICULOPATHY: Status: ACTIVE | Noted: 2025-02-24

## 2025-03-19 PROBLEM — M51.26 HERNIATED NUCLEUS PULPOSUS, L3-4 LEFT: Status: ACTIVE | Noted: 2025-02-24

## 2025-03-19 PROBLEM — G91.2 NORMAL PRESSURE HYDROCEPHALUS (MULTI): Status: RESOLVED | Noted: 2025-03-19 | Resolved: 2025-03-19

## 2025-03-19 PROBLEM — G91.2 NORMAL PRESSURE HYDROCEPHALUS (MULTI): Status: ACTIVE | Noted: 2025-03-19

## 2025-04-08 PROBLEM — M17.0 ARTHRITIS OF BOTH KNEES: Status: ACTIVE | Noted: 2025-03-04

## 2025-04-08 PROBLEM — I25.10 CORONARY ARTERY DISEASE INVOLVING NATIVE CORONARY ARTERY OF NATIVE HEART WITHOUT ANGINA PECTORIS: Chronic | Status: ACTIVE | Noted: 2025-04-08

## 2025-04-08 PROBLEM — E78.2 MIXED HYPERLIPIDEMIA: Chronic | Status: ACTIVE | Noted: 2025-04-08

## 2025-04-08 PROBLEM — M17.0 ARTHRITIS OF BOTH KNEES: Status: RESOLVED | Noted: 2025-03-04 | Resolved: 2025-04-08

## 2025-04-08 NOTE — PROGRESS NOTES
Referred by Ashley PATEL I am seeing Luis for evaluation of coronary artery disease.  He is 74.  He likely has Parkinson's and hyperlipidemia.  He has intermittent hypertension.  He was admitted to the hospital because of back pain.  CAT scan was done at [facility] which revealed coronary calcification of the LAD territory.  He did have a chest pain but he is not terribly active.  He walks with a cane.  He has no shortness of breath no palpitations.  His LDL cholesterol was 167.    Past Medical History:  Problem List Items Addressed This Visit    None     No past medical history on file.     Past Surgical History:  He has no past surgical history on file.      Social History:  He reports that he quit smoking about 30 years ago. His smoking use included cigarettes. He has never used smokeless tobacco. He reports that he does not drink alcohol and does not use drugs.    Family History:  No family history on file.     Allergies:  Bactrim [sulfamethoxazole-trimethoprim] and Penicillins    Outpatient Medications:  Current Outpatient Medications   Medication Instructions    carbidopa-levodopa (Sinemet)  mg tablet Take 1 tab TID    divalproex (DEPAKOTE) 500 mg, 2 times daily    gabapentin (Neurontin) 300 mg capsule Take 1 cap in evening x 7 days then 2 caps at bedtime and continue    LORazepam (ATIVAN) 0.5 mg, Every 6 hours PRN    methylPREDNISolone (MEDROL DOSPAK) 4 mg    prazosin (MINIPRESS) 2 mg, Nightly    probenecid (BENEMID) 500 mg, 2 times daily    propranolol (INDERAL) 40 mg, 2 times daily    tiZANidine (Zanaflex) 4 mg tablet 1 tablet, 3 times daily PRN     Last Recorded Vitals:  There were no vitals filed for this visit.    Physical Exam  Patient is alert and oriented x3.  HEENT is unremarkable mucous members are moist  Neck no JVP no bruits upstrokes are full no thyromegaly  Lungs are clear bilaterally.  No wheezing crackles or rales  Heart regular rhythm normal S1-S2 there is no S3 no murmurs are  "heard.  Abdomen is soft bs are positive nontender nondistended no organomegaly no pulsatile masses  Extremities have no edema.  Distal pulses present palpable.  Neuro is grossly nonfocal  Skin has no rashes     Last Labs:  CBC -  Lab Results   Component Value Date    WBC 5.0 12/14/2021    HGB 17.0 12/14/2021    HCT 48.4 12/14/2021    MCV 93 12/14/2021     12/14/2021     CMP -  Lab Results   Component Value Date    CALCIUM 9.4 09/13/2022    PROT 6.3 (L) 12/14/2021    ALBUMIN 4.1 12/14/2021    AST 16 12/14/2021    ALT 14 12/14/2021    ALKPHOS 69 12/14/2021    BILITOT 0.9 12/14/2021     LIPID PANEL -   Lab Results   Component Value Date    CHOL 269 (H) 02/19/2021    HDL 43.3 02/19/2021    CHHDL 6.2 (A) 02/19/2021    VLDL 59 (H) 02/19/2021    TRIG 295 (H) 02/19/2021    NHDL 226 02/19/2021     RENAL FUNCTION PANEL -   Lab Results   Component Value Date    K 4.4 09/13/2022       No results found for: \"BNP\", \"HGBA1C\"  Procedures       CT chest 2/12/2025 significant calcification LAD    Assessment/Plan   1.  CAD.  Coronary calcification noted on a CT scan [facility].  Mostly in the LAD.  An EKG will be done today.  Of asked for him to have a regadenoson nuclear stress test to identify if there is any demonstrable ischemia in the LAD territory.  His lack of symptoms is not helpful in the context of him not being very active.  I will start a baby aspirin and rosuvastatin.    2.  Hyperlipidemia.   in 2021.  Will initiate rosuvastatin 40 mg.  Target LDL less than 70 preferably closer to 55.  Fasting blood work 3 months.    EKG today.  Regadenoson nuclear stress test.  Start rosuvastatin 40 mg and a baby aspirin daily.  Fasting blood work in 3 months.  Return to see me 4 months.    Emily Malik MD     Instructions and follow up    "

## 2025-04-09 ENCOUNTER — OFFICE VISIT (OUTPATIENT)
Dept: CARDIOLOGY | Facility: CLINIC | Age: 75
End: 2025-04-09
Payer: MEDICARE

## 2025-04-09 VITALS
BODY MASS INDEX: 27.17 KG/M2 | WEIGHT: 184 LBS | OXYGEN SATURATION: 97 % | DIASTOLIC BLOOD PRESSURE: 84 MMHG | HEART RATE: 57 BPM | SYSTOLIC BLOOD PRESSURE: 136 MMHG

## 2025-04-09 DIAGNOSIS — E78.2 MIXED HYPERLIPIDEMIA: Chronic | ICD-10-CM

## 2025-04-09 DIAGNOSIS — I25.10 CORONARY ARTERY DISEASE INVOLVING NATIVE CORONARY ARTERY OF NATIVE HEART WITHOUT ANGINA PECTORIS: Primary | Chronic | ICD-10-CM

## 2025-04-09 LAB
ATRIAL RATE: 94 BPM
P OFFSET: 193 MS
P ONSET: 149 MS
PR INTERVAL: 158 MS
Q ONSET: 228 MS
QRS COUNT: 11 BEATS
QRS DURATION: 76 MS
QT INTERVAL: 424 MS
QTC CALCULATION(BAZETT): 470 MS
QTC FREDERICIA: 455 MS
R AXIS: 13 DEGREES
T AXIS: 4 DEGREES
T OFFSET: 440 MS
VENTRICULAR RATE: 74 BPM

## 2025-04-09 PROCEDURE — 1160F RVW MEDS BY RX/DR IN RCRD: CPT | Performed by: INTERNAL MEDICINE

## 2025-04-09 PROCEDURE — 93005 ELECTROCARDIOGRAM TRACING: CPT | Performed by: INTERNAL MEDICINE

## 2025-04-09 PROCEDURE — 1159F MED LIST DOCD IN RCRD: CPT | Performed by: INTERNAL MEDICINE

## 2025-04-09 PROCEDURE — 1036F TOBACCO NON-USER: CPT | Performed by: INTERNAL MEDICINE

## 2025-04-09 PROCEDURE — 99214 OFFICE O/P EST MOD 30 MIN: CPT | Performed by: INTERNAL MEDICINE

## 2025-04-09 PROCEDURE — 99204 OFFICE O/P NEW MOD 45 MIN: CPT | Performed by: INTERNAL MEDICINE

## 2025-04-09 RX ORDER — ROSUVASTATIN CALCIUM 40 MG/1
40 TABLET, COATED ORAL DAILY
Qty: 30 TABLET | Refills: 11 | Status: SHIPPED | OUTPATIENT
Start: 2025-04-09 | End: 2026-04-09

## 2025-04-09 RX ORDER — NAPROXEN SODIUM 220 MG/1
81 TABLET, FILM COATED ORAL DAILY
Qty: 30 TABLET | Refills: 11 | Status: SHIPPED | OUTPATIENT
Start: 2025-04-09 | End: 2026-04-09

## 2025-04-09 NOTE — PATIENT INSTRUCTIONS
1.  CAD.  Coronary calcification noted on a CT scan [facility].  Mostly in the LAD.  An EKG will be done today.  Of asked for him to have a regadenoson nuclear stress test to identify if there is any demonstrable ischemia in the LAD territory.  His lack of symptoms is not helpful in the context of him not being very active.  I will start a baby aspirin and rosuvastatin.    2.  Hyperlipidemia.   in 2021.  Will initiate rosuvastatin 40 mg.  Target LDL less than 70 preferably closer to 55.  Fasting blood work 3 months.    EKG today.  Regadenoson nuclear stress test.  Start rosuvastatin 40 mg and a baby aspirin daily.  Fasting blood work in 3 months.  Return to see me 4 months.

## 2025-05-19 ENCOUNTER — APPOINTMENT (OUTPATIENT)
Dept: RADIOLOGY | Facility: CLINIC | Age: 75
End: 2025-05-19
Payer: COMMERCIAL

## 2025-05-19 ENCOUNTER — APPOINTMENT (OUTPATIENT)
Dept: CARDIOLOGY | Facility: CLINIC | Age: 75
End: 2025-05-19
Payer: COMMERCIAL

## 2025-05-19 ENCOUNTER — APPOINTMENT (OUTPATIENT)
Dept: RADIOLOGY | Facility: HOSPITAL | Age: 75
End: 2025-05-19
Payer: COMMERCIAL

## 2025-05-19 ENCOUNTER — APPOINTMENT (OUTPATIENT)
Dept: CARDIOLOGY | Facility: HOSPITAL | Age: 75
End: 2025-05-19
Payer: COMMERCIAL

## 2025-05-28 ENCOUNTER — APPOINTMENT (OUTPATIENT)
Dept: NEUROLOGY | Facility: CLINIC | Age: 75
End: 2025-05-28
Payer: MEDICARE

## 2025-05-28 VITALS
WEIGHT: 181 LBS | HEART RATE: 52 BPM | SYSTOLIC BLOOD PRESSURE: 141 MMHG | HEIGHT: 69 IN | DIASTOLIC BLOOD PRESSURE: 83 MMHG | TEMPERATURE: 96.8 F | BODY MASS INDEX: 26.81 KG/M2

## 2025-05-28 DIAGNOSIS — G20.A1 PARKINSON'S DISEASE WITHOUT DYSKINESIA, UNSPECIFIED WHETHER MANIFESTATIONS FLUCTUATE: Primary | ICD-10-CM

## 2025-05-28 DIAGNOSIS — G20.C PARKINSONISM, UNSPECIFIED PARKINSONISM TYPE (MULTI): ICD-10-CM

## 2025-05-28 PROCEDURE — G2211 COMPLEX E/M VISIT ADD ON: HCPCS | Performed by: PSYCHIATRY & NEUROLOGY

## 2025-05-28 PROCEDURE — 3008F BODY MASS INDEX DOCD: CPT | Performed by: PSYCHIATRY & NEUROLOGY

## 2025-05-28 PROCEDURE — 1160F RVW MEDS BY RX/DR IN RCRD: CPT | Performed by: PSYCHIATRY & NEUROLOGY

## 2025-05-28 PROCEDURE — 1125F AMNT PAIN NOTED PAIN PRSNT: CPT | Performed by: PSYCHIATRY & NEUROLOGY

## 2025-05-28 PROCEDURE — 1159F MED LIST DOCD IN RCRD: CPT | Performed by: PSYCHIATRY & NEUROLOGY

## 2025-05-28 PROCEDURE — 1036F TOBACCO NON-USER: CPT | Performed by: PSYCHIATRY & NEUROLOGY

## 2025-05-28 PROCEDURE — 99214 OFFICE O/P EST MOD 30 MIN: CPT | Performed by: PSYCHIATRY & NEUROLOGY

## 2025-05-28 RX ORDER — CARBIDOPA AND LEVODOPA 25; 100 MG/1; MG/1
TABLET ORAL
Qty: 405 TABLET | Refills: 3 | Status: SHIPPED | OUTPATIENT
Start: 2025-05-28

## 2025-05-28 ASSESSMENT — PATIENT HEALTH QUESTIONNAIRE - PHQ9
3. TROUBLE FALLING OR STAYING ASLEEP OR SLEEPING TOO MUCH: SEVERAL DAYS
SUM OF ALL RESPONSES TO PHQ9 QUESTIONS 1 AND 2: 4
9. THOUGHTS THAT YOU WOULD BE BETTER OFF DEAD, OR OF HURTING YOURSELF: NOT AT ALL
5. POOR APPETITE OR OVEREATING: NOT AT ALL
6. FEELING BAD ABOUT YOURSELF - OR THAT YOU ARE A FAILURE OR HAVE LET YOURSELF OR YOUR FAMILY DOWN: NOT AT ALL
8. MOVING OR SPEAKING SO SLOWLY THAT OTHER PEOPLE COULD HAVE NOTICED. OR THE OPPOSITE, BEING SO FIGETY OR RESTLESS THAT YOU HAVE BEEN MOVING AROUND A LOT MORE THAN USUAL: SEVERAL DAYS
7. TROUBLE CONCENTRATING ON THINGS, SUCH AS READING THE NEWSPAPER OR WATCHING TELEVISION: NOT AT ALL
2. FEELING DOWN, DEPRESSED OR HOPELESS: MORE THAN HALF THE DAYS
4. FEELING TIRED OR HAVING LITTLE ENERGY: SEVERAL DAYS
SUM OF ALL RESPONSES TO PHQ QUESTIONS 1-9: 7
1. LITTLE INTEREST OR PLEASURE IN DOING THINGS: MORE THAN HALF THE DAYS

## 2025-05-28 ASSESSMENT — ENCOUNTER SYMPTOMS
LOSS OF SENSATION IN FEET: 0
OCCASIONAL FEELINGS OF UNSTEADINESS: 1
DEPRESSION: 0

## 2025-05-28 ASSESSMENT — PAIN SCALES - GENERAL: PAINLEVEL_OUTOF10: 5

## 2025-05-28 NOTE — PROGRESS NOTES
Subjective     Danial Salazar is a 74 y.o. year old RH male here for Parkinson disease follow up.    HPI  Patient is accompanied by his daughter.  His legs buckle, are weak.   Left leg is numb for about a year.   Was in the [facility] ER twice recently in February due to leg pain and found to have lumbar radiculopathy.  Had epidural injection in lower spine.   Pain is better, tolerable.  Left side is worse.   Memory is okay.   Saw a spine surgeon at [facility]   He takes multiple naps.     Current PD Meds:  Sinemet 25-100mg 1 tab TID   Right hand tremor is still present.  No changes.   MRI brain October 2023 which was normal.  DaTscan  September 2023 which was normal.     records from neurologist in the past was reviewed, dated November 2023. His gait seems to be worsening he is falling more.  His right hand shaking seems worse.  He started propranolol which does not seem to help his tremor.  He is off Abilify.  On Depakote 500mg BID.  He has a history of bipolar disorder, lumbar disease and chronic low back pain memory has been poor.    Right hand tremor began 5 years ago once in a while, was worse once on Abilify.    No hallucinations.   He does elliptical 10-15 min / day.  Psychiatry NP and not comfortable changing his medications.  No constipation. No dizziness.     He was in [facility].   He has had epidural injections in the lower back with pain mgmt, Dr. Beckett.   FH: mother may have had memory issues. He does not know his brother's health.    He is taking depakote , prazosin, abilify, probenecid, propranolol, ativan, hydroxyxine.   Review of Systems    Patient Active Problem List   Diagnosis    Parkinson's disease    Left lumbar radiculopathy    Herniated nucleus pulposus, L3-4 left    Coronary artery disease involving native coronary artery of native heart without angina pectoris    Mixed hyperlipidemia     Past Medical History:   Diagnosis Date    Coronary artery disease involving native coronary artery  of native heart without angina pectoris 2025    Mixed hyperlipidemia 2025    Elevated baseline  in        Past Surgical History:   Procedure Laterality Date    TONSILLECTOMY       Social History     Tobacco Use    Smoking status: Former     Current packs/day: 0.00     Types: Cigarettes     Quit date:      Years since quittin.4    Smokeless tobacco: Never   Substance Use Topics    Alcohol use: Yes     Alcohol/week: 3.0 standard drinks of alcohol     Types: 3 Glasses of wine per week     family history is not on file.    Current Outpatient Medications:     aspirin 81 mg chewable tablet, Chew 1 tablet (81 mg) once daily., Disp: 30 tablet, Rfl: 11    carbidopa-levodopa (Sinemet)  mg tablet, Take 1 tab TID, Disp: 405 tablet, Rfl: 3    divalproex (Depakote) 500 mg EC tablet, Take 1 tablet (500 mg) by mouth 2 times a day., Disp: , Rfl:     gabapentin (Neurontin) 300 mg capsule, Take 1 cap in evening x 7 days then 2 caps at bedtime and continue, Disp: 60 capsule, Rfl: 3    LORazepam (Ativan) 0.5 mg tablet, 1 tablet (0.5 mg) every 6 hours if needed., Disp: , Rfl:     methylPREDNISolone (Medrol Dospak) 4 mg tablets, 1 tablet (4 mg)., Disp: , Rfl:     prazosin (Minipress) 2 mg capsule, Take 1 capsule (2 mg) by mouth once daily at bedtime., Disp: , Rfl:     probenecid (Benemid) 500 mg tablet, Take 1 tablet (500 mg) by mouth 2 times a day., Disp: , Rfl:     propranolol (Inderal) 40 mg tablet, Take 1 tablet (40 mg) by mouth 2 times a day., Disp: , Rfl:     rosuvastatin (Crestor) 40 mg tablet, Take 1 tablet (40 mg) by mouth once daily., Disp: 30 tablet, Rfl: 11    tiZANidine (Zanaflex) 4 mg tablet, Take 1 tablet (4 mg) by mouth 3 times a day as needed., Disp: , Rfl:   Allergies   Allergen Reactions    Penicillins Rash and Unknown    Sulfamethoxazole Rash    Sulfamethoxazole-Trimethoprim Rash and Unknown     /83 (BP Location: Left arm, Patient Position: Sitting)   Pulse 52   Temp 36 °C  "(96.8 °F)   Ht 1.753 m (5' 9\")   Wt 82.1 kg (181 lb)   BMI 26.73 kg/m²   Neurological Exam/Physical Exam:    Constitutional: General appearance: no acute distress. Pleasant.  In wheelchair. Hypomimia.  Flat affect.  Auscultation of Heart: Regular rate and rhythm, no murmurs, normal S1 and S2.   Carotid Arteries: no bruits  Peripheral Vascular Exam: No swelling, edema or tenderness to palpation in extremities  Mental status: no distress, alert, interactive and cooperative. Affect is appropriate.   Orientation: oriented to person, oriented to place  Memory: recent memory impaired.  Hypophonia. Dysarthria.   Eyes: The ophthalmoscopic examination was normal.   Cranial nerve II: Visual fields full to confrontation.   Cranial nerves III, IV, and VI: Pupils round, equally reactive to light; EOMs slow in all directions of gaze.   Cranial Nerve V: Facial sensation intact to LT bilaterally.   Cranial nerve VII: no facial droop  Cranial nerve VIII: Hearing is intact  Cranial nerves IX and X: Palate elevates symmetrically.   Cranial nerve XI: Shoulder shrug intact.   Cranial nerve XII: Tongue is midline.  Motor:  LLE 4/5. RLE 5-/5. UE 5/5 b/l.   Mild -moderate resting tremor of RUE, RUE and RLE.  Has moderate bradykinesia on RUE and RLE.   Deep Tendon Reflexes: left biceps 2+ , right biceps 2+, left triceps 2+, right triceps 2+, left brachioradialis 2+, right brachioradialis 2+, left patella 2+, right patella 2+, left ankle jerk 1+, right ankle jerk 1+   Plantar Reflex: Toes downgoing to plantar stimulation on the left. Toes downgoing to plantar stimulation on the right.   Sensory Exam: Normal to vibratory sensation  Coordination:  no limb dystaxia   Gait:  both knees are buckled ( R is worse), unsteady , uses a cane. leans toward the right.         Labs:  CBC:   Lab Results   Component Value Date    WBC 5.0 12/14/2021    HGB 17.0 12/14/2021    HCT 48.4 12/14/2021     12/14/2021     BMP:   Lab Results   Component " Value Date     09/13/2022    K 4.4 09/13/2022     09/13/2022    CO2 28 09/13/2022    BUN 17 09/13/2022    CREATININE 1.22 09/13/2022    CALCIUM 9.4 09/13/2022     LFT:   Lab Results   Component Value Date    ALKPHOS 69 12/14/2021    BILITOT 0.9 12/14/2021    BILIDIR 0.1 12/14/2021    PROT 6.3 (L) 12/14/2021    ALBUMIN 4.1 12/14/2021    ALT 14 12/14/2021    AST 16 12/14/2021         Assessment/Plan   Problem List Items Addressed This Visit    None    This is a chronic neurologic condition that requires ongoing care and monitoring. This is a complex, serious condition that needs long term care going forward. Between myself and the patient we will be changing direction of care depending on responses to treatment.   Today we discussed medication options, non medication options for management and various other symptoms that are in relation to this disease.  I will continue to be involved in the care of this patient.      Parkinsonism , atypical with dementia.  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  Increase Sinemet 25-100mg 1.5 tabs 8am/ 1 tab at 12pm and 1 tab at 5pm  DaTscan and MRI brain are normal.  Off abilify now.  High fall risk, PT offered.  Continue to follow pain mgmt and psychiatry.

## 2025-05-28 NOTE — PATIENT INSTRUCTIONS
"It was a pleasure seeing you today.     Increase Sinemet (carbidopa-levadopa) 25-100mg Take 1.5 tabs 8am/ 1 tab at 12pm and 1 tab at 5pm  This medication should help with symptoms like tremor, slow movements and stiffness.        Please make a follow up appointment in 4-5 months.  You may also schedule a phone or virtual visit sooner on a Friday morning with me as needed before the next clinic appointment.     For any urgent issues or needing to speak to a medical assistant please call [phone], option 6 during our office hours Monday-Friday 8am-4pm, and leave a voicemail with your concern.  My office will try to reach back you as soon as possible within 24 (business) hours.  If you have an emergency please call 911 or visit a local urgent care or nearest emergency room.      Please understand that [employer] is a useful communication tool for simple \"normal\" results or a refill request but I would not recommend using this tool for emergent or urgent issues or for conversations with me.  I am happy to ask my staff to rearrange a follow up visit or a virtual visit sooner than requested if appropriate for your care.    "

## 2025-06-13 ENCOUNTER — HOSPITAL ENCOUNTER (OUTPATIENT)
Dept: RADIOLOGY | Facility: HOSPITAL | Age: 75
Discharge: HOME | End: 2025-06-13
Payer: MEDICARE

## 2025-06-13 ENCOUNTER — HOSPITAL ENCOUNTER (OUTPATIENT)
Dept: CARDIOLOGY | Facility: HOSPITAL | Age: 75
Discharge: HOME | End: 2025-06-13
Payer: MEDICARE

## 2025-06-13 DIAGNOSIS — I25.10 CORONARY ARTERY DISEASE INVOLVING NATIVE CORONARY ARTERY OF NATIVE HEART WITHOUT ANGINA PECTORIS: Primary | Chronic | ICD-10-CM

## 2025-06-13 DIAGNOSIS — E78.2 MIXED HYPERLIPIDEMIA: Chronic | ICD-10-CM

## 2025-06-13 DIAGNOSIS — I25.10 CORONARY ARTERY DISEASE INVOLVING NATIVE CORONARY ARTERY OF NATIVE HEART WITHOUT ANGINA PECTORIS: Chronic | ICD-10-CM

## 2025-06-13 PROCEDURE — 93018 CV STRESS TEST I&R ONLY: CPT | Performed by: INTERNAL MEDICINE

## 2025-06-13 PROCEDURE — 93017 CV STRESS TEST TRACING ONLY: CPT

## 2025-06-13 PROCEDURE — 3430000001 HC RX 343 DIAGNOSTIC RADIOPHARMACEUTICALS: Performed by: INTERNAL MEDICINE

## 2025-06-13 PROCEDURE — 93016 CV STRESS TEST SUPVJ ONLY: CPT | Performed by: INTERNAL MEDICINE

## 2025-06-13 PROCEDURE — A9502 TC99M TETROFOSMIN: HCPCS | Performed by: INTERNAL MEDICINE

## 2025-06-13 PROCEDURE — 78452 HT MUSCLE IMAGE SPECT MULT: CPT

## 2025-06-13 PROCEDURE — 2500000004 HC RX 250 GENERAL PHARMACY W/ HCPCS (ALT 636 FOR OP/ED): Performed by: INTERNAL MEDICINE

## 2025-06-13 RX ORDER — REGADENOSON 0.08 MG/ML
0.4 INJECTION, SOLUTION INTRAVENOUS ONCE
Status: COMPLETED | OUTPATIENT
Start: 2025-06-13 | End: 2025-06-13

## 2025-06-13 RX ADMIN — REGADENOSON 0.4 MG: 0.08 INJECTION, SOLUTION INTRAVENOUS at 09:48

## 2025-06-13 RX ADMIN — TETROFOSMIN 11.7 MILLICURIE: 0.23 INJECTION, POWDER, LYOPHILIZED, FOR SOLUTION INTRAVENOUS at 08:32

## 2025-06-13 RX ADMIN — TETROFOSMIN 35.4 MILLICURIE: 0.23 INJECTION, POWDER, LYOPHILIZED, FOR SOLUTION INTRAVENOUS at 09:49

## 2025-06-30 PROBLEM — H66.92 OTITIS OF LEFT EAR: Status: ACTIVE | Noted: 2025-05-20

## 2025-06-30 PROBLEM — R05.2 SUBACUTE COUGH: Status: ACTIVE | Noted: 2025-05-20

## 2025-07-03 DIAGNOSIS — M54.16 LUMBAR RADICULOPATHY: ICD-10-CM

## 2025-07-03 RX ORDER — GABAPENTIN 300 MG/1
CAPSULE ORAL
Qty: 60 CAPSULE | Refills: 3 | Status: SHIPPED | OUTPATIENT
Start: 2025-07-03

## 2025-07-09 DIAGNOSIS — E78.2 MIXED HYPERLIPIDEMIA: Chronic | ICD-10-CM

## 2025-07-09 DIAGNOSIS — I25.10 CORONARY ARTERY DISEASE INVOLVING NATIVE CORONARY ARTERY OF NATIVE HEART WITHOUT ANGINA PECTORIS: Chronic | ICD-10-CM

## 2025-07-16 ENCOUNTER — APPOINTMENT (OUTPATIENT)
Dept: CARDIOLOGY | Facility: CLINIC | Age: 75
End: 2025-07-16
Payer: COMMERCIAL

## 2025-07-16 VITALS
SYSTOLIC BLOOD PRESSURE: 142 MMHG | OXYGEN SATURATION: 94 % | WEIGHT: 180 LBS | DIASTOLIC BLOOD PRESSURE: 84 MMHG | HEART RATE: 51 BPM | BODY MASS INDEX: 26.58 KG/M2

## 2025-07-16 DIAGNOSIS — I25.10 CORONARY ARTERY DISEASE INVOLVING NATIVE CORONARY ARTERY OF NATIVE HEART WITHOUT ANGINA PECTORIS: Primary | Chronic | ICD-10-CM

## 2025-07-16 DIAGNOSIS — E78.2 MIXED HYPERLIPIDEMIA: Chronic | ICD-10-CM

## 2025-07-16 PROBLEM — N18.9 CHRONIC KIDNEY DISEASE: Chronic | Status: ACTIVE | Noted: 2025-07-16

## 2025-07-16 LAB
ALBUMIN SERPL-MCNC: 4.6 G/DL (ref 3.6–5.1)
ALP SERPL-CCNC: 68 U/L (ref 35–144)
ALT SERPL-CCNC: 4 U/L (ref 9–46)
ANION GAP SERPL CALCULATED.4IONS-SCNC: 12 MMOL/L (CALC) (ref 7–17)
AST SERPL-CCNC: 18 U/L (ref 10–35)
BILIRUB SERPL-MCNC: 0.8 MG/DL (ref 0.2–1.2)
BUN SERPL-MCNC: 17 MG/DL (ref 7–25)
CALCIUM SERPL-MCNC: 9.2 MG/DL (ref 8.6–10.3)
CHLORIDE SERPL-SCNC: 100 MMOL/L (ref 98–110)
CHOLEST SERPL-MCNC: 146 MG/DL
CHOLEST/HDLC SERPL: 2.6 (CALC)
CO2 SERPL-SCNC: 26 MMOL/L (ref 20–32)
CREAT SERPL-MCNC: 1.53 MG/DL (ref 0.7–1.28)
EGFRCR SERPLBLD CKD-EPI 2021: 47 ML/MIN/1.73M2
GLUCOSE SERPL-MCNC: 101 MG/DL (ref 65–99)
HDLC SERPL-MCNC: 56 MG/DL
LDLC SERPL CALC-MCNC: 64 MG/DL (CALC)
NONHDLC SERPL-MCNC: 90 MG/DL (CALC)
POTASSIUM SERPL-SCNC: 4.7 MMOL/L (ref 3.5–5.3)
PROT SERPL-MCNC: 7 G/DL (ref 6.1–8.1)
SODIUM SERPL-SCNC: 138 MMOL/L (ref 135–146)
TRIGL SERPL-MCNC: 187 MG/DL

## 2025-07-16 PROCEDURE — 99213 OFFICE O/P EST LOW 20 MIN: CPT | Performed by: INTERNAL MEDICINE

## 2025-07-16 PROCEDURE — 1160F RVW MEDS BY RX/DR IN RCRD: CPT | Performed by: INTERNAL MEDICINE

## 2025-07-16 PROCEDURE — 1159F MED LIST DOCD IN RCRD: CPT | Performed by: INTERNAL MEDICINE

## 2025-07-16 RX ORDER — ROSUVASTATIN CALCIUM 40 MG/1
40 TABLET, COATED ORAL DAILY
Qty: 90 TABLET | Refills: 4 | Status: SHIPPED | OUTPATIENT
Start: 2025-07-16 | End: 2026-07-16

## 2025-07-16 NOTE — PROGRESS NOTES
Referred by King PATEL I am seeing Luis for evaluation of coronary artery disease.  He is 74.  He likely has Parkinson's and hyperlipidemia.  He has intermittent hypertension.  He was admitted to the hospital because of back pain.  CAT scan was done at [facility] which revealed coronary calcification of the LAD territory.  He did have a chest pain but he is not terribly active.  He walks with a cane.  He has no shortness of breath no palpitations.  His LDL cholesterol was 167.    Past Medical History:  Problem List Items Addressed This Visit    None     Past Medical History:   Diagnosis Date    Coronary artery disease involving native coronary artery of native heart without angina pectoris 04/08/2025    Mixed hyperlipidemia 04/08/2025    Elevated baseline  in 2021        Past Surgical History:  He has a past surgical history that includes Tonsillectomy.      Social History:  He reports that he quit smoking about 30 years ago. His smoking use included cigarettes. He has never used smokeless tobacco. He reports current alcohol use of about 3.0 standard drinks of alcohol per week. He reports that he does not use drugs.    Family History:  No family history on file.     Allergies:  Penicillins, Sulfamethoxazole, and Sulfamethoxazole-trimethoprim    Outpatient Medications:  Current Outpatient Medications   Medication Instructions    aspirin 81 mg, oral, Daily    carbidopa-levodopa (Sinemet)  mg tablet Take 1.5 tabs 8am/ 1 tab at 12pm and 1 tab at 5pm    divalproex (DEPAKOTE) 500 mg, 2 times daily    gabapentin (Neurontin) 300 mg capsule Take 1 cap in evening x 7 days then 2 caps at bedtime and continue    LORazepam (ATIVAN) 0.5 mg, Every 6 hours PRN    methylPREDNISolone (MEDROL DOSPAK) 4 mg    prazosin (MINIPRESS) 2 mg, Nightly    probenecid (BENEMID) 500 mg, 2 times daily    propranolol (INDERAL) 40 mg, 2 times daily    rosuvastatin (CRESTOR) 40 mg, oral, Daily    tiZANidine (Zanaflex) 4 mg tablet 1 tablet, 3  "times daily PRN     Last Recorded Vitals:  There were no vitals filed for this visit.    Physical Exam  Patient is alert and oriented x3.  HEENT is unremarkable mucous members are moist  Neck no JVP no bruits upstrokes are full no thyromegaly  Lungs are clear bilaterally.  No wheezing crackles or rales  Heart regular rhythm normal S1-S2 there is no S3 no murmurs are heard.  Abdomen is soft bs are positive nontender nondistended no organomegaly no pulsatile masses  Extremities have no edema.  Distal pulses present palpable.  Neuro is grossly nonfocal  Skin has no rashes     Last Labs:  CBC -  Lab Results   Component Value Date    WBC 5.0 12/14/2021    HGB 17.0 12/14/2021    HCT 48.4 12/14/2021    MCV 93 12/14/2021     12/14/2021     CMP -  Lab Results   Component Value Date    CALCIUM 9.2 07/15/2025    PROT 7.0 07/15/2025    ALBUMIN 4.6 07/15/2025    AST 18 07/15/2025    ALT 4 (L) 07/15/2025    ALKPHOS 68 07/15/2025    BILITOT 0.8 07/15/2025     LIPID PANEL -   Lab Results   Component Value Date    CHOL 146 07/15/2025    HDL 56 07/15/2025    CHHDL 2.6 07/15/2025    VLDL 59 (H) 02/19/2021    TRIG 187 (H) 07/15/2025    NHDL 90 07/15/2025     RENAL FUNCTION PANEL -   Lab Results   Component Value Date    K 4.7 07/15/2025       No results found for: \"BNP\", \"HGBA1C\"  Procedures    AST 6/13/2025 probable normal diaphragmatic attenuation artifact EF 62%       CT chest 2/12/2025 significant calcification LAD    Assessment/Plan   1.  CAD.  Coronary calcification noted on a CT scan [facility].  Mostly in the LAD.  An EKG will be done today.   I will start a baby aspirin and rosuvastatin.  Regadenoson nuclear stress test 6/13/2025 probable normal diaphragmatic attenuation artifact and an ejection fraction of 62%.    2.  Hyperlipidemia.   in 2021.  Started on rosuvastatin 40 mg.  7/15/2025 LDL 64 HDL 56 triglycerides 187 LFTs normal. These are very good. He'll fu with Dr. Pino for routing BW    3.  CKD stage IIIa. B/c " 17/1.53    4. HTN- at home SBP are in the 120-130's    RTC 1 year. BW with Dr. Odette Malik MD     Instructions and follow up

## 2025-07-16 NOTE — PATIENT INSTRUCTIONS
1.  CAD.  Coronary calcification noted on a CT scan [facility].  Mostly in the LAD.  An EKG will be done today.   I will start a baby aspirin and rosuvastatin.  Regadenoson nuclear stress test 6/13/2025 probable normal diaphragmatic attenuation artifact and an ejection fraction of 62%.    2.  Hyperlipidemia.   in 2021.  Started on rosuvastatin 40 mg.  7/15/2025 LDL 64 HDL 56 triglycerides 187 LFTs normal    3.  CKD stage IIIa. B/c 17/1.53    4. HTN- at home SBP are in the 120-130's    RTC 1 year. BW with Dr. Ramirez

## 2025-08-14 ENCOUNTER — HOSPITAL ENCOUNTER (OUTPATIENT)
Dept: CARDIOLOGY | Facility: CLINIC | Age: 75
Discharge: HOME | End: 2025-08-14
Payer: MEDICARE

## 2025-08-14 DIAGNOSIS — F33.1 MAJOR DEPRESSIVE DISORDER, RECURRENT EPISODE, MODERATE: ICD-10-CM

## 2025-08-14 PROCEDURE — 93005 ELECTROCARDIOGRAM TRACING: CPT

## 2025-08-15 LAB
ATRIAL RATE: 55 BPM
P AXIS: 42 DEGREES
P OFFSET: 191 MS
P ONSET: 140 MS
PR INTERVAL: 178 MS
Q ONSET: 229 MS
QRS COUNT: 9 BEATS
QRS DURATION: 72 MS
QT INTERVAL: 400 MS
QTC CALCULATION(BAZETT): 382 MS
QTC FREDERICIA: 388 MS
R AXIS: 36 DEGREES
T AXIS: 55 DEGREES
T OFFSET: 429 MS
VENTRICULAR RATE: 55 BPM

## 2025-09-23 ENCOUNTER — APPOINTMENT (OUTPATIENT)
Dept: NEUROLOGY | Facility: CLINIC | Age: 75
End: 2025-09-23
Payer: COMMERCIAL

## 2026-07-29 ENCOUNTER — APPOINTMENT (OUTPATIENT)
Dept: CARDIOLOGY | Facility: CLINIC | Age: 76
End: 2026-07-29
Payer: COMMERCIAL